# Patient Record
Sex: MALE | Race: WHITE | Employment: FULL TIME | URBAN - METROPOLITAN AREA
[De-identification: names, ages, dates, MRNs, and addresses within clinical notes are randomized per-mention and may not be internally consistent; named-entity substitution may affect disease eponyms.]

---

## 2017-02-14 ENCOUNTER — OFFICE VISIT (OUTPATIENT)
Dept: FAMILY MEDICINE CLINIC | Age: 42
End: 2017-02-14

## 2017-02-14 VITALS
RESPIRATION RATE: 13 BRPM | OXYGEN SATURATION: 98 % | BODY MASS INDEX: 36.51 KG/M2 | HEIGHT: 71 IN | SYSTOLIC BLOOD PRESSURE: 136 MMHG | DIASTOLIC BLOOD PRESSURE: 90 MMHG | HEART RATE: 84 BPM | TEMPERATURE: 98.3 F | WEIGHT: 260.8 LBS

## 2017-02-14 DIAGNOSIS — K12.2 UVULITIS: Primary | ICD-10-CM

## 2017-02-14 PROCEDURE — 99213 OFFICE O/P EST LOW 20 MIN: CPT | Performed by: FAMILY MEDICINE

## 2017-02-14 PROCEDURE — 96372 THER/PROPH/DIAG INJ SC/IM: CPT | Performed by: FAMILY MEDICINE

## 2017-02-14 RX ORDER — AMOXICILLIN AND CLAVULANATE POTASSIUM 875; 125 MG/1; MG/1
1 TABLET, FILM COATED ORAL 2 TIMES DAILY WITH MEALS
Qty: 20 TABLET | Refills: 0 | Status: SHIPPED | OUTPATIENT
Start: 2017-02-14 | End: 2017-02-24

## 2017-02-14 RX ORDER — METHYLPREDNISOLONE ACETATE 80 MG/ML
80 INJECTION, SUSPENSION INTRA-ARTICULAR; INTRALESIONAL; INTRAMUSCULAR; SOFT TISSUE ONCE
Status: COMPLETED | OUTPATIENT
Start: 2017-02-14 | End: 2017-02-14

## 2017-02-14 RX ORDER — METHYLPREDNISOLONE ACETATE 40 MG/ML
40 INJECTION, SUSPENSION INTRA-ARTICULAR; INTRALESIONAL; INTRAMUSCULAR; SOFT TISSUE ONCE
Status: COMPLETED | OUTPATIENT
Start: 2017-02-14 | End: 2017-02-14

## 2017-02-14 RX ADMIN — METHYLPREDNISOLONE ACETATE 80 MG: 80 INJECTION, SUSPENSION INTRA-ARTICULAR; INTRALESIONAL; INTRAMUSCULAR; SOFT TISSUE at 10:36

## 2017-02-14 RX ADMIN — METHYLPREDNISOLONE ACETATE 40 MG: 40 INJECTION, SUSPENSION INTRA-ARTICULAR; INTRALESIONAL; INTRAMUSCULAR; SOFT TISSUE at 10:34

## 2017-02-14 ASSESSMENT — ENCOUNTER SYMPTOMS
GASTROINTESTINAL NEGATIVE: 1
RESPIRATORY NEGATIVE: 1
VOICE CHANGE: 0
SORE THROAT: 0
TROUBLE SWALLOWING: 1

## 2017-11-08 DIAGNOSIS — M10.00 IDIOPATHIC GOUT, UNSPECIFIED CHRONICITY, UNSPECIFIED SITE: ICD-10-CM

## 2017-11-09 RX ORDER — ALLOPURINOL 300 MG/1
300 TABLET ORAL DAILY
Qty: 90 TABLET | Refills: 3 | Status: SHIPPED | OUTPATIENT
Start: 2017-11-09 | End: 2019-03-01 | Stop reason: SDUPTHER

## 2019-03-01 ENCOUNTER — OFFICE VISIT (OUTPATIENT)
Dept: FAMILY MEDICINE CLINIC | Age: 44
End: 2019-03-01
Payer: COMMERCIAL

## 2019-03-01 VITALS
HEIGHT: 71 IN | RESPIRATION RATE: 16 BRPM | SYSTOLIC BLOOD PRESSURE: 136 MMHG | DIASTOLIC BLOOD PRESSURE: 88 MMHG | WEIGHT: 260.6 LBS | HEART RATE: 67 BPM | OXYGEN SATURATION: 96 % | BODY MASS INDEX: 36.48 KG/M2

## 2019-03-01 DIAGNOSIS — Z00.129 ENCOUNTER FOR ROUTINE CHILD HEALTH EXAMINATION WITHOUT ABNORMAL FINDINGS: Primary | ICD-10-CM

## 2019-03-01 DIAGNOSIS — M10.00 IDIOPATHIC GOUT, UNSPECIFIED CHRONICITY, UNSPECIFIED SITE: ICD-10-CM

## 2019-03-01 DIAGNOSIS — E66.9 OBESITY (BMI 30-39.9): ICD-10-CM

## 2019-03-01 PROCEDURE — 99396 PREV VISIT EST AGE 40-64: CPT | Performed by: FAMILY MEDICINE

## 2019-03-01 RX ORDER — ALLOPURINOL 300 MG/1
300 TABLET ORAL DAILY
Qty: 90 TABLET | Refills: 3 | Status: SHIPPED | OUTPATIENT
Start: 2019-03-01 | End: 2020-07-29 | Stop reason: SDUPTHER

## 2019-03-01 RX ORDER — INDOMETHACIN 50 MG/1
50 CAPSULE ORAL EVERY 8 HOURS PRN
Qty: 30 CAPSULE | Refills: 2 | Status: SHIPPED | OUTPATIENT
Start: 2019-03-01 | End: 2021-01-26 | Stop reason: SDUPTHER

## 2019-03-01 RX ORDER — PHENTERMINE HYDROCHLORIDE 37.5 MG/1
37.5 CAPSULE ORAL EVERY MORNING
Qty: 30 CAPSULE | Refills: 0 | Status: SHIPPED | OUTPATIENT
Start: 2019-03-01 | End: 2019-03-29 | Stop reason: SDUPTHER

## 2019-03-01 ASSESSMENT — ENCOUNTER SYMPTOMS
GASTROINTESTINAL NEGATIVE: 1
RESPIRATORY NEGATIVE: 1

## 2019-03-01 ASSESSMENT — PATIENT HEALTH QUESTIONNAIRE - PHQ9
SUM OF ALL RESPONSES TO PHQ QUESTIONS 1-9: 0
1. LITTLE INTEREST OR PLEASURE IN DOING THINGS: 0
2. FEELING DOWN, DEPRESSED OR HOPELESS: 0
SUM OF ALL RESPONSES TO PHQ QUESTIONS 1-9: 0
SUM OF ALL RESPONSES TO PHQ9 QUESTIONS 1 & 2: 0

## 2019-03-29 ENCOUNTER — OFFICE VISIT (OUTPATIENT)
Dept: FAMILY MEDICINE CLINIC | Age: 44
End: 2019-03-29
Payer: COMMERCIAL

## 2019-03-29 VITALS
WEIGHT: 247.4 LBS | RESPIRATION RATE: 12 BRPM | SYSTOLIC BLOOD PRESSURE: 128 MMHG | DIASTOLIC BLOOD PRESSURE: 76 MMHG | HEART RATE: 84 BPM | HEIGHT: 70 IN | BODY MASS INDEX: 35.42 KG/M2

## 2019-03-29 DIAGNOSIS — E66.9 OBESITY (BMI 30-39.9): ICD-10-CM

## 2019-03-29 DIAGNOSIS — M10.00 IDIOPATHIC GOUT, UNSPECIFIED CHRONICITY, UNSPECIFIED SITE: Primary | ICD-10-CM

## 2019-03-29 PROCEDURE — 99213 OFFICE O/P EST LOW 20 MIN: CPT | Performed by: FAMILY MEDICINE

## 2019-03-29 RX ORDER — PHENTERMINE HYDROCHLORIDE 37.5 MG/1
37.5 CAPSULE ORAL EVERY MORNING
Qty: 30 CAPSULE | Refills: 0 | Status: SHIPPED | OUTPATIENT
Start: 2019-03-29 | End: 2019-04-26

## 2019-03-29 ASSESSMENT — ENCOUNTER SYMPTOMS
GASTROINTESTINAL NEGATIVE: 1
RESPIRATORY NEGATIVE: 1

## 2019-04-26 ENCOUNTER — OFFICE VISIT (OUTPATIENT)
Dept: FAMILY MEDICINE CLINIC | Age: 44
End: 2019-04-26
Payer: COMMERCIAL

## 2019-04-26 VITALS
BODY MASS INDEX: 34.61 KG/M2 | SYSTOLIC BLOOD PRESSURE: 130 MMHG | HEART RATE: 76 BPM | HEIGHT: 71 IN | WEIGHT: 247.2 LBS | DIASTOLIC BLOOD PRESSURE: 78 MMHG | RESPIRATION RATE: 20 BRPM

## 2019-04-26 DIAGNOSIS — E66.9 OBESITY (BMI 30-39.9): ICD-10-CM

## 2019-04-26 DIAGNOSIS — E78.00 PURE HYPERCHOLESTEROLEMIA: Primary | ICD-10-CM

## 2019-04-26 PROCEDURE — 99213 OFFICE O/P EST LOW 20 MIN: CPT | Performed by: FAMILY MEDICINE

## 2019-04-26 RX ORDER — PHENTERMINE HYDROCHLORIDE 37.5 MG/1
37.5 CAPSULE ORAL EVERY MORNING
Qty: 30 CAPSULE | Refills: 0 | Status: SHIPPED | OUTPATIENT
Start: 2019-04-26 | End: 2019-05-26

## 2019-04-26 ASSESSMENT — ENCOUNTER SYMPTOMS
GASTROINTESTINAL NEGATIVE: 1
RESPIRATORY NEGATIVE: 1

## 2019-04-26 NOTE — PROGRESS NOTES
Subjective:      Patient ID: Maciej Montoya is a 44 y.o. male. HPI:    Chief Complaint   Patient presents with    1 Month Follow-Up     obesity    Medication Refill     1 month eval.      Weight did not budge. 243 at home. Wt Readings from Last 3 Encounters:   04/26/19 247 lb 3.2 oz (112.1 kg)   03/29/19 247 lb 6.4 oz (112.2 kg)   03/01/19 260 lb 9.6 oz (118.2 kg)     Tolerating the medication fine. He is doing Weight Watchers. Patient Active Problem List   Diagnosis    Hyperhidrosis    Gout    Hyperlipidemia     Past Surgical History:   Procedure Laterality Date    TONSILLECTOMY      as a child     Prior to Admission medications    Medication Sig Start Date End Date Taking? Authorizing Provider   phentermine 37.5 MG capsule Take 1 capsule by mouth every morning for 30 days. 3/29/19 4/28/19 Yes Myrna Head DO   aluminum chloride (DRYSOL) 20 % external solution Apply topically as directed. 3/1/19  Yes Myrna Head DO   allopurinol (ZYLOPRIM) 300 MG tablet Take 1 tablet by mouth daily 3/1/19  Yes Myrna Head DO   indomethacin (INDOCIN) 50 MG capsule Take 1 capsule by mouth every 8 hours as needed (gout) 3/1/19  Yes Myrna Head DO         Review of Systems   Constitutional: Negative. HENT: Negative. Respiratory: Negative. Cardiovascular: Negative. Gastrointestinal: Negative. Musculoskeletal: Negative. All other systems reviewed and are negative. Objective:   Physical Exam   Constitutional: He is oriented to person, place, and time. He appears well-developed and well-nourished. HENT:   Head: Normocephalic and atraumatic. Right Ear: Tympanic membrane normal.   Left Ear: Tympanic membrane normal.   Mouth/Throat: Oropharynx is clear and moist and mucous membranes are normal.   Cardiovascular: Normal rate, regular rhythm and normal heart sounds. No murmur heard. Pulmonary/Chest: Effort normal and breath sounds normal.   Abdominal: Soft.  Bowel sounds are normal.   Musculoskeletal: He exhibits no edema. Neurological: He is alert and oriented to person, place, and time. Skin: Skin is warm and dry. Psychiatric: He has a normal mood and affect. His behavior is normal.   Nursing note and vitals reviewed. Assessment:       Diagnosis Orders   1. Pure hypercholesterolemia     2.  Obesity (BMI 30-39.9)  phentermine 37.5 MG capsule           Plan:      -  Pt down 13 per our scales, more at home scale per pt  -  Last refill sent  -  RTO prn        Sandro Doshi DO

## 2019-04-26 NOTE — PROGRESS NOTES
Visit Information    Have you changed or started any medications since your last visit including any over-the-counter medicines, vitamins, or herbal medicines? no   Are you having any side effects from any of your medications? -  no  Have you stopped taking any of your medications? Is so, why? -  no    Have you seen any other physician or provider since your last visit? No  Have you had any other diagnostic tests since your last visit? No  Have you been seen in the emergency room and/or had an admission to a hospital since we last saw you? No  Have you had your routine dental cleaning in the past 6 months? yes - 3/2019    Have you activated your [employer] account? If not, what are your barriers?  Yes     Patient Care Team:  Shirlene Rich DO as PCP - General (Family Medicine)    Medical History Review  Past Medical, Family, and Social History reviewed and does contribute to the patient presenting condition    Health Maintenance   Topic Date Due    HIV screen  09/11/1991    Diabetes screen  09/11/2016    Flu vaccine (Season Ended) 09/01/2019    Lipid screen  08/26/2021    DTaP/Tdap/Td vaccine (2 - Td) 02/01/2023    Pneumococcal 0-64 years Vaccine  Aged Out

## 2019-07-08 ENCOUNTER — OFFICE VISIT (OUTPATIENT)
Dept: FAMILY MEDICINE CLINIC | Age: 44
End: 2019-07-08
Payer: COMMERCIAL

## 2019-07-08 VITALS
RESPIRATION RATE: 20 BRPM | TEMPERATURE: 97.9 F | SYSTOLIC BLOOD PRESSURE: 144 MMHG | BODY MASS INDEX: 35.11 KG/M2 | HEIGHT: 71 IN | HEART RATE: 92 BPM | WEIGHT: 250.8 LBS | DIASTOLIC BLOOD PRESSURE: 74 MMHG

## 2019-07-08 DIAGNOSIS — H60.502 ACUTE OTITIS EXTERNA OF LEFT EAR, UNSPECIFIED TYPE: Primary | ICD-10-CM

## 2019-07-08 PROCEDURE — 99213 OFFICE O/P EST LOW 20 MIN: CPT | Performed by: FAMILY MEDICINE

## 2019-07-08 RX ORDER — CIPROFLOXACIN AND DEXAMETHASONE 3; 1 MG/ML; MG/ML
4 SUSPENSION/ DROPS AURICULAR (OTIC) 2 TIMES DAILY
Qty: 1 BOTTLE | Refills: 0 | Status: SHIPPED | OUTPATIENT
Start: 2019-07-08 | End: 2019-07-09

## 2019-07-08 NOTE — PROGRESS NOTES
Visit Information    Have you changed or started any medications since your last visit including any over-the-counter medicines, vitamins, or herbal medicines? no   Are you having any side effects from any of your medications? -  no  Have you stopped taking any of your medications? Is so, why? -  no    Have you seen any other physician or provider since your last visit? No  Have you had any other diagnostic tests since your last visit? No  Have you been seen in the emergency room and/or had an admission to a hospital since we last saw you? No  Have you had your routine dental cleaning in the past 6 months? yes - 2/2019    Have you activated your [employer] account? If not, what are your barriers?  Yes     Patient Care Team:  Amanda Jones DO as PCP - General (Family Medicine)  Amanda Jones DO as PCP - [facility] Provider    Medical History Review  Past Medical, Family, and Social History reviewed and does not contribute to the patient presenting condition    Health Maintenance   Topic Date Due    HIV screen  09/11/1991    Diabetes screen  09/11/2016    Flu vaccine (1) 09/01/2019    Lipid screen  08/26/2021    DTaP/Tdap/Td vaccine (2 - Td) 02/01/2023    Pneumococcal 0-64 years Vaccine  Aged Out
edema. Neurological: He is alert and oriented to person, place, and time. Skin: Skin is warm and dry. Psychiatric: He has a normal mood and affect. His behavior is normal.   Nursing note and vitals reviewed. Assessment:       Diagnosis Orders   1.  Acute otitis externa of left ear, unspecified type  ciprofloxacin-dexamethasone (CIPRODEX) 0.3-0.1 % otic suspension           Plan:      -  Orders above  -  Motrin, heating pad  -  RTO prn        Zabrina Garcia DO

## 2019-07-09 ENCOUNTER — TELEPHONE (OUTPATIENT)
Dept: FAMILY MEDICINE CLINIC | Age: 44
End: 2019-07-09

## 2019-07-09 ASSESSMENT — ENCOUNTER SYMPTOMS
GASTROINTESTINAL NEGATIVE: 1
RESPIRATORY NEGATIVE: 1

## 2019-09-04 ENCOUNTER — TELEPHONE (OUTPATIENT)
Dept: FAMILY MEDICINE CLINIC | Age: 44
End: 2019-09-04

## 2020-06-17 ENCOUNTER — PATIENT MESSAGE (OUTPATIENT)
Dept: FAMILY MEDICINE CLINIC | Age: 45
End: 2020-06-17

## 2020-07-29 RX ORDER — ALLOPURINOL 300 MG/1
300 TABLET ORAL DAILY
Qty: 90 TABLET | Refills: 3 | Status: SHIPPED | OUTPATIENT
Start: 2020-07-29 | End: 2021-02-01 | Stop reason: SDUPTHER

## 2020-10-07 ENCOUNTER — HOSPITAL ENCOUNTER (EMERGENCY)
Age: 45
Discharge: HOME | End: 2020-10-07
Payer: COMMERCIAL

## 2020-10-07 VITALS
OXYGEN SATURATION: 97 % | SYSTOLIC BLOOD PRESSURE: 159 MMHG | WEIGHT: 260 LBS | TEMPERATURE: 97.7 F | RESPIRATION RATE: 18 BRPM | HEART RATE: 88 BPM | DIASTOLIC BLOOD PRESSURE: 100 MMHG | BODY MASS INDEX: 36.4 KG/M2 | HEIGHT: 71 IN

## 2020-10-07 PROCEDURE — 6370000000 HC RX 637 (ALT 250 FOR IP)

## 2020-10-07 PROCEDURE — 99213 OFFICE O/P EST LOW 20 MIN: CPT | Performed by: NURSE PRACTITIONER

## 2020-10-07 PROCEDURE — 99212 OFFICE O/P EST SF 10 MIN: CPT

## 2020-10-07 RX ORDER — BACITRACIN, NEOMYCIN, POLYMYXIN B 400; 3.5; 5 [USP'U]/G; MG/G; [USP'U]/G
OINTMENT TOPICAL
Status: COMPLETED
Start: 2020-10-07 | End: 2020-10-07

## 2020-10-07 RX ORDER — BACITRACIN, NEOMYCIN, POLYMYXIN B 400; 3.5; 5 [USP'U]/G; MG/G; [USP'U]/G
OINTMENT TOPICAL ONCE
Status: DISCONTINUED | OUTPATIENT
Start: 2020-10-07 | End: 2020-10-07 | Stop reason: HOSPADM

## 2020-10-07 RX ADMIN — BACITRACIN, NEOMYCIN, POLYMYXIN B 1 G: 400; 3.5; 5 OINTMENT TOPICAL at 10:49

## 2020-10-07 ASSESSMENT — ENCOUNTER SYMPTOMS
VOMITING: 0
NAUSEA: 0

## 2020-10-07 NOTE — ED NOTES
Wound wash to left index finger per order. Dressing applied. Patient denies questions or concerns at this time.       Isra Joshi RN  10/07/20 [id number]

## 2020-10-07 NOTE — ED PROVIDER NOTES
[address]  Urgent Care Encounter       CHIEF COMPLAINT       Chief Complaint   Patient presents with    Laceration       Nurses Notes reviewed and I agree except as noted in the HPI. HISTORY OF PRESENT ILLNESS   Nataliya Arias is a 41 y.o. male who presents with a injury to the distal left index finger that occurred yesterday around noon. The patient cut the skin off the tip of his finger while using a pair of scissors. He washed the area and applied a dressing with gauze. Every time he removes the dressing the area begins bleeding again, prompting his evaluation at the urgent care center. The patient is unsure of his last tetanus vaccine but refuses an update today. The history is provided by the patient. REVIEW OF SYSTEMS     Review of Systems   Constitutional: Negative for fever. Gastrointestinal: Negative for nausea and vomiting. Skin: Positive for wound (Tip of left index finger). Neurological: Negative for numbness. PAST MEDICAL HISTORY         Diagnosis Date    Gout        SURGICALHISTORY     Patient  has a past surgical history that includes Tonsillectomy. CURRENT MEDICATIONS       Current Discharge Medication List      CONTINUE these medications which have NOT CHANGED    Details   allopurinol (ZYLOPRIM) 300 MG tablet Take 1 tablet by mouth daily  Qty: 90 tablet, Refills: 3    Associated Diagnoses: Idiopathic gout, unspecified chronicity, unspecified site      aluminum chloride (DRYSOL) 20 % external solution Apply topically as directed. Qty: 1 Bottle, Refills: 5      indomethacin (INDOCIN) 50 MG capsule Take 1 capsule by mouth every 8 hours as needed (gout)  Qty: 30 capsule, Refills: 2    Associated Diagnoses: Idiopathic gout, unspecified chronicity, unspecified site             ALLERGIES     Patient is has No Known Allergies.     Patients   Immunization History   Administered Date(s) Administered    Tdap (Boostrix, Adacel) 02/01/2013       FAMILY HISTORY Patient's family history includes High Blood Pressure in his father. SOCIAL HISTORY     Patient  reports that he has never smoked. He has never used smokeless tobacco. He reports that he does not drink alcohol or use drugs. PHYSICAL EXAM     ED TRIAGE VITALS  BP: (!) 159/100, Temp: 97.7 °F (36.5 °C), Pulse: 88, Resp: 18, SpO2: 97 %,Estimated body mass index is 36.26 kg/m² as calculated from the following:    Height as of this encounter: 5' 11\" (1.803 m). Weight as of this encounter: 260 lb (117.9 kg). ,No LMP for male patient. Physical Exam  Vitals signs and nursing note reviewed. Constitutional:       General: He is not in acute distress. Appearance: He is well-developed. HENT:      Head: Normocephalic and atraumatic. Pulmonary:      Effort: Pulmonary effort is normal. No respiratory distress. Musculoskeletal:      Left hand: He exhibits laceration (Avulsion injury to the lateral tip of the index finger without involvement of the nail. Some scabbing and fresh bleeding noted. No redness or swelling. ). Skin:     General: Skin is warm and dry. Neurological:      General: No focal deficit present. Mental Status: He is alert and oriented to person, place, and time. Psychiatric:         Mood and Affect: Mood normal.         Speech: Speech normal.         Behavior: Behavior normal. Behavior is cooperative. DIAGNOSTIC RESULTS     Labs:No results found for this visit on 10/07/20.     IMAGING:    No orders to display         EKG:      URGENT CARE COURSE:     Vitals:    10/07/20 1037   BP: (!) 159/100   Pulse: 88   Resp: 18   Temp: 97.7 °F (36.5 °C)   TempSrc: Temporal   SpO2: 97%   Weight: 260 lb (117.9 kg)   Height: 5' 11\" (1.803 m)       Medications   neomycin-bacitracin-polymyxin (NEOSPORIN) ointment ( Topical Canceled Entry 10/7/20 1049)   neomycin-bacitracin-polymyxin (NEOSPORIN) 400-5-5000 ointment (1 g  Given 10/7/20 1049)            PROCEDURES:  None    FINAL IMPRESSION 1. Avulsion of skin of index finger, initial encounter          DISPOSITION/ PLAN     Patient presents with an avulsion of the tip of the left index finger. Small lateral bleeding continues. No sign of infection. Helistat dressing and nonstick dressing applied. Small pressure dressing also applied to the tip of the finger. Patient is to leave the dressing intact for the next 24 hours and then wash twice a day with soap and water and apply antibiotic ointment twice daily as well. Cover with nonstick dressing. Monitor for any signs and symptoms of infection which were discussed. Follow-up with your doctor return urgent care with any concerns. Further instructions were outlined verbally and in the patient's discharge instructions. All the patient's questions were answered. The patient/parent agreed with the plan and was discharged from the [facility] in good condition.       PATIENT REFERRED TO:  DO Abiola Ortez, [address] / [facility] [address]      DISCHARGE MEDICATIONS:  Current Discharge Medication List          Current Discharge Medication List          Current Discharge Medication List          LORRAINE Angel - YAZAN    (Please note that portions of this note were completed with a voice recognition program. Efforts were made to edit the dictations but occasionally words are mis-transcribed.)         LORRAINE Angel - YAZAN  10/07/20 2673

## 2020-10-08 ENCOUNTER — TELEPHONE (OUTPATIENT)
Dept: FAMILY MEDICINE CLINIC | Age: 45
End: 2020-10-08

## 2021-01-26 DIAGNOSIS — M10.00 IDIOPATHIC GOUT, UNSPECIFIED CHRONICITY, UNSPECIFIED SITE: ICD-10-CM

## 2021-01-26 RX ORDER — INDOMETHACIN 50 MG/1
50 CAPSULE ORAL EVERY 8 HOURS PRN
Qty: 30 CAPSULE | Refills: 2 | Status: SHIPPED | OUTPATIENT
Start: 2021-01-26 | End: 2022-05-06

## 2021-01-26 NOTE — TELEPHONE ENCOUNTER
Requested Prescriptions     Pending Prescriptions Disp Refills    indomethacin (INDOCIN) 50 MG capsule 30 capsule 2     Sig: Take 1 capsule by mouth every 8 hours as needed (gout)

## 2021-02-01 ENCOUNTER — OFFICE VISIT (OUTPATIENT)
Dept: FAMILY MEDICINE CLINIC | Age: 46
End: 2021-02-01
Payer: COMMERCIAL

## 2021-02-01 VITALS
BODY MASS INDEX: 36.75 KG/M2 | RESPIRATION RATE: 16 BRPM | WEIGHT: 263.5 LBS | DIASTOLIC BLOOD PRESSURE: 104 MMHG | HEART RATE: 80 BPM | SYSTOLIC BLOOD PRESSURE: 144 MMHG | TEMPERATURE: 97.4 F

## 2021-02-01 DIAGNOSIS — E78.00 PURE HYPERCHOLESTEROLEMIA: ICD-10-CM

## 2021-02-01 DIAGNOSIS — I10 ESSENTIAL HYPERTENSION: ICD-10-CM

## 2021-02-01 DIAGNOSIS — Z00.00 WELL ADULT HEALTH CHECK: Primary | ICD-10-CM

## 2021-02-01 DIAGNOSIS — M10.00 IDIOPATHIC GOUT, UNSPECIFIED CHRONICITY, UNSPECIFIED SITE: ICD-10-CM

## 2021-02-01 PROCEDURE — 99396 PREV VISIT EST AGE 40-64: CPT | Performed by: FAMILY MEDICINE

## 2021-02-01 RX ORDER — AMLODIPINE BESYLATE AND BENAZEPRIL HYDROCHLORIDE 5; 10 MG/1; MG/1
1 CAPSULE ORAL DAILY
Qty: 30 CAPSULE | Refills: 1 | Status: SHIPPED | OUTPATIENT
Start: 2021-02-01 | End: 2021-02-15

## 2021-02-01 RX ORDER — ALLOPURINOL 300 MG/1
300 TABLET ORAL DAILY
Qty: 90 TABLET | Refills: 3 | Status: SHIPPED | OUTPATIENT
Start: 2021-02-01 | End: 2021-06-09 | Stop reason: SDUPTHER

## 2021-02-01 SDOH — ECONOMIC STABILITY: FOOD INSECURITY: WITHIN THE PAST 12 MONTHS, YOU WORRIED THAT YOUR FOOD WOULD RUN OUT BEFORE YOU GOT MONEY TO BUY MORE.: NEVER TRUE

## 2021-02-01 SDOH — ECONOMIC STABILITY: INCOME INSECURITY: HOW HARD IS IT FOR YOU TO PAY FOR THE VERY BASICS LIKE FOOD, HOUSING, MEDICAL CARE, AND HEATING?: NOT HARD AT ALL

## 2021-02-01 SDOH — ECONOMIC STABILITY: TRANSPORTATION INSECURITY
IN THE PAST 12 MONTHS, HAS LACK OF TRANSPORTATION KEPT YOU FROM MEETINGS, WORK, OR FROM GETTING THINGS NEEDED FOR DAILY LIVING?: NO

## 2021-02-01 SDOH — ECONOMIC STABILITY: FOOD INSECURITY: WITHIN THE PAST 12 MONTHS, THE FOOD YOU BOUGHT JUST DIDN'T LAST AND YOU DIDN'T HAVE MONEY TO GET MORE.: NEVER TRUE

## 2021-02-01 SDOH — ECONOMIC STABILITY: TRANSPORTATION INSECURITY
IN THE PAST 12 MONTHS, HAS THE LACK OF TRANSPORTATION KEPT YOU FROM MEDICAL APPOINTMENTS OR FROM GETTING MEDICATIONS?: NO

## 2021-02-01 ASSESSMENT — ENCOUNTER SYMPTOMS
GASTROINTESTINAL NEGATIVE: 1
RESPIRATORY NEGATIVE: 1

## 2021-02-01 ASSESSMENT — PATIENT HEALTH QUESTIONNAIRE - PHQ9
SUM OF ALL RESPONSES TO PHQ9 QUESTIONS 1 & 2: 0
1. LITTLE INTEREST OR PLEASURE IN DOING THINGS: 0
SUM OF ALL RESPONSES TO PHQ QUESTIONS 1-9: 0

## 2021-02-01 NOTE — PROGRESS NOTES
Visit Information    Have you changed or started any medications since your last visit including any over-the-counter medicines, vitamins, or herbal medicines? no   Are you having any side effects from any of your medications? -  no  Have you stopped taking any of your medications? Is so, why? -  no    Have you seen any other physician or provider since your last visit? No  Have you had any other diagnostic tests since your last visit? No  Have you been seen in the emergency room and/or had an admission to a hospital since we last saw you? No  Have you had your routine dental cleaning in the past 6 months? yes - 10/2020    Have you activated your [employer] account? If not, what are your barriers?  Yes     Patient Care Team:  Tita Villa,  as PCP - General (Family Medicine)  Tita Villa,  as PCP - [facility] Provider    Medical History Review  Past Medical, Family, and Social History reviewed and does contribute to the patient presenting condition    Health Maintenance   Topic Date Due    Hepatitis C screen  1976    HIV screen  09/11/1991    Diabetes screen  09/11/2016    Flu vaccine (1) 09/01/2020    Lipid screen  08/26/2021    DTaP/Tdap/Td vaccine (2 - Td) 02/01/2023    Hepatitis A vaccine  Aged Out    Hepatitis B vaccine  Aged Out    Hib vaccine  Aged Out    Meningococcal (ACWY) vaccine  Aged Out    Pneumococcal 0-64 years Vaccine  Aged Out

## 2021-02-01 NOTE — PROGRESS NOTES
 Highest education level: Bachelor's degree (e.g., BA, AB, BS)   Occupational History    Not on file   Social Needs    Financial resource strain: Not hard at all   [place] insecurity     Worry: Never true     Inability: Never true   [employer] needs     Medical: No     Non-medical: No   Tobacco Use    Smoking status: Never Smoker    Smokeless tobacco: Never Used   Substance and Sexual Activity    Alcohol use: No    Drug use: No    Sexual activity: Not on file   Lifestyle    Physical activity     Days per week: Not on file     Minutes per session: Not on file    Stress: Not on file   Relationships    Social connections     Talks on phone: Not on file     Gets together: Not on file     Attends [employer] service: Not on file     Active member of club or organization: Not on file     Attends meetings of clubs or organizations: Not on file     Relationship status: Not on file    Intimate partner violence     Fear of current or ex partner: Not on file     Emotionally abused: Not on file     Physically abused: Not on file     Forced sexual activity: Not on file   Other Topics Concern    Not on file   Social History Narrative    Not on file        Family History   Problem Relation Age of Onset    High Blood Pressure Father        ADVANCE DIRECTIVE: N, <no information>    Vitals:    02/01/21 0848 02/01/21 0852   BP: (!) 158/104 (!) 144/104   Site: Left Upper Arm Left Upper Arm   Position: Sitting Sitting   Cuff Size: Large Adult Large Adult   Pulse: 80    Resp: 16    Temp: 97.4 °F (36.3 °C)    TempSrc: Temporal    Weight: 263 lb 8 oz (119.5 kg)      Estimated body mass index is 36.75 kg/m² as calculated from the following:    Height as of 10/7/20: 5' 11\" (1.803 m). Weight as of this encounter: 263 lb 8 oz (119.5 kg). Physical Exam  Vitals signs and nursing note reviewed. Constitutional:       General: He is not in acute distress. Appearance: Normal appearance. He is well-developed.    HENT: Head: Normocephalic and atraumatic. Right Ear: Tympanic membrane normal.      Left Ear: Tympanic membrane normal.   Eyes:      Conjunctiva/sclera: Conjunctivae normal.   Neck:      Musculoskeletal: Neck supple. Cardiovascular:      Rate and Rhythm: Normal rate and regular rhythm. Heart sounds: Normal heart sounds. No murmur. Pulmonary:      Effort: Pulmonary effort is normal.      Breath sounds: Normal breath sounds. No wheezing, rhonchi or rales. Abdominal:      General: There is no distension. Skin:     General: Skin is warm and dry. Findings: No rash (on exposed surfaces). Neurological:      General: No focal deficit present. Mental Status: He is alert. Psychiatric:         Attention and Perception: Attention normal.         Mood and Affect: Mood normal.         Speech: Speech normal.         Behavior: Behavior normal. Behavior is cooperative. Thought Content: Thought content normal.         Judgment: Judgment normal.         No flowsheet data found.     Lab Results   Component Value Date    CHOL 195 08/26/2016    CHOL 187 02/10/2015    TRIG 379 08/26/2016    TRIG 403 02/10/2015    HDL 33 08/26/2016    HDL 29 02/10/2015    LDLCALC 86 08/26/2016    LDLCALC SEE BELOW 02/10/2015    GLUCOSE 98 02/10/2015       The ASCVD Risk score (Chelsi Bruce, et al., 2013) failed to calculate for the following reasons:    Cannot find a previous HDL lab    Cannot find a previous total cholesterol lab    Immunization History   Administered Date(s) Administered    Influenza A ([id number]) Vaccine PF IM 02/04/2010    Tdap (Boostrix, Adacel) 02/01/2013       Health Maintenance   Topic Date Due    Potassium monitoring  1976    Creatinine monitoring  1976    Hepatitis C screen  1976    HIV screen  09/11/1991    Diabetes screen  09/11/2016    Flu vaccine (1) 09/01/2020    Lipid screen  08/26/2021    DTaP/Tdap/Td vaccine (2 - Td) 02/01/2023  Hepatitis A vaccine  Aged Out    Hepatitis B vaccine  Aged Out    Hib vaccine  Aged Out    Meningococcal (ACWY) vaccine  Aged Out    Pneumococcal 0-64 years Vaccine  Aged Out       ASSESSMENT/PLAN:  1. Well adult health check  -     Lipid Panel w/ Reflex Direct LDL; Future  -     Comprehensive Metabolic Panel; Future  -     Hemoglobin A1C; Future  -     TSH with Reflex; Future  -     CBC Auto Differential; Future  2. Idiopathic gout, unspecified chronicity, unspecified site  -     allopurinol (ZYLOPRIM) 300 MG tablet; Take 1 tablet by mouth daily, Disp-90 tablet, R-3Normal  -     Uric Acid; Future  3. Pure hypercholesterolemia  4. Essential hypertension  -     amLODIPine-benazepril (LOTREL) 5-10 MG per capsule; Take 1 capsule by mouth daily, Disp-30 capsule, R-1Normal    -  Healthy lifestyle discussed  -  Chronic medical problems stable  -  Continue current medications  -  Start Lotrel  -  Check labs above    Return in about 2 weeks (around 2/15/2021) for HTN. An electronic signature was used to authenticate this note.     --Armand Chase DO on 2/1/2021 at 10:08 AM

## 2021-02-09 ENCOUNTER — HOSPITAL ENCOUNTER (OUTPATIENT)
Age: 46
Discharge: HOME | End: 2021-02-09
Payer: COMMERCIAL

## 2021-02-09 DIAGNOSIS — M10.00 IDIOPATHIC GOUT, UNSPECIFIED CHRONICITY, UNSPECIFIED SITE: ICD-10-CM

## 2021-02-09 DIAGNOSIS — Z00.00 WELL ADULT HEALTH CHECK: ICD-10-CM

## 2021-02-09 LAB
ALBUMIN SERPL-MCNC: 4.5 G/DL (ref 3.5–5.1)
ALP BLD-CCNC: 88 U/L (ref 38–126)
ALT SERPL-CCNC: 87 U/L (ref 11–66)
ANION GAP SERPL CALCULATED.3IONS-SCNC: 10 MEQ/L (ref 8–16)
AST SERPL-CCNC: 54 U/L (ref 5–40)
AVERAGE GLUCOSE: 96 MG/DL (ref 70–126)
BASOPHILS # BLD: 0.8 %
BASOPHILS ABSOLUTE: 0.1 THOU/MM3 (ref 0–0.1)
BILIRUB SERPL-MCNC: 0.6 MG/DL (ref 0.3–1.2)
BUN BLDV-MCNC: 14 MG/DL (ref 7–22)
CALCIUM SERPL-MCNC: 9.8 MG/DL (ref 8.5–10.5)
CHLORIDE BLD-SCNC: 104 MEQ/L (ref 98–111)
CHOLESTEROL, TOTAL: 181 MG/DL (ref 100–199)
CO2: 28 MEQ/L (ref 23–33)
CREAT SERPL-MCNC: 1 MG/DL (ref 0.4–1.2)
EOSINOPHIL # BLD: 2 %
EOSINOPHILS ABSOLUTE: 0.2 THOU/MM3 (ref 0–0.4)
ERYTHROCYTE [DISTWIDTH] IN BLOOD BY AUTOMATED COUNT: 13.3 % (ref 11.5–14.5)
ERYTHROCYTE [DISTWIDTH] IN BLOOD BY AUTOMATED COUNT: 41.1 FL (ref 35–45)
GFR SERPL CREATININE-BSD FRML MDRD: 81 ML/MIN/1.73M2
GLUCOSE BLD-MCNC: 101 MG/DL (ref 70–108)
HBA1C MFR BLD: 5.2 % (ref 4.4–6.4)
HCT VFR BLD CALC: 46.1 % (ref 42–52)
HDLC SERPL-MCNC: 31 MG/DL
HEMOGLOBIN: 15.5 GM/DL (ref 14–18)
IMMATURE GRANS (ABS): 0.03 THOU/MM3 (ref 0–0.07)
IMMATURE GRANULOCYTES: 0.4 %
LDL CHOLESTEROL CALCULATED: 86 MG/DL
LYMPHOCYTES # BLD: 20.9 %
LYMPHOCYTES ABSOLUTE: 1.6 THOU/MM3 (ref 1–4.8)
MCH RBC QN AUTO: 29 PG (ref 26–33)
MCHC RBC AUTO-ENTMCNC: 33.6 GM/DL (ref 32.2–35.5)
MCV RBC AUTO: 86.2 FL (ref 80–94)
MONOCYTES # BLD: 7.5 %
MONOCYTES ABSOLUTE: 0.6 THOU/MM3 (ref 0.4–1.3)
NUCLEATED RED BLOOD CELLS: 0 /100 WBC
PLATELET # BLD: 295 THOU/MM3 (ref 130–400)
PMV BLD AUTO: 10.3 FL (ref 9.4–12.4)
POTASSIUM SERPL-SCNC: 4.7 MEQ/L (ref 3.5–5.2)
RBC # BLD: 5.35 MILL/MM3 (ref 4.7–6.1)
SEG NEUTROPHILS: 68.4 %
SEGMENTED NEUTROPHILS ABSOLUTE COUNT: 5.2 THOU/MM3 (ref 1.8–7.7)
SODIUM BLD-SCNC: 142 MEQ/L (ref 135–145)
TOTAL PROTEIN: 7.9 G/DL (ref 6.1–8)
TRIGL SERPL-MCNC: 318 MG/DL (ref 0–199)
TSH SERPL DL<=0.05 MIU/L-ACNC: 1.89 UIU/ML (ref 0.4–4.2)
URIC ACID: 6.3 MG/DL (ref 3.7–7)
WBC # BLD: 7.6 THOU/MM3 (ref 4.8–10.8)

## 2021-02-09 PROCEDURE — 84443 ASSAY THYROID STIM HORMONE: CPT

## 2021-02-09 PROCEDURE — 80053 COMPREHEN METABOLIC PANEL: CPT

## 2021-02-09 PROCEDURE — 84550 ASSAY OF BLOOD/URIC ACID: CPT

## 2021-02-09 PROCEDURE — 36415 COLL VENOUS BLD VENIPUNCTURE: CPT

## 2021-02-09 PROCEDURE — 85025 COMPLETE CBC W/AUTO DIFF WBC: CPT

## 2021-02-09 PROCEDURE — 80061 LIPID PANEL: CPT

## 2021-02-09 PROCEDURE — 83036 HEMOGLOBIN GLYCOSYLATED A1C: CPT

## 2021-02-15 ENCOUNTER — OFFICE VISIT (OUTPATIENT)
Dept: FAMILY MEDICINE CLINIC | Age: 46
End: 2021-02-15
Payer: COMMERCIAL

## 2021-02-15 VITALS
SYSTOLIC BLOOD PRESSURE: 140 MMHG | TEMPERATURE: 97.3 F | HEART RATE: 80 BPM | WEIGHT: 264 LBS | BODY MASS INDEX: 36.82 KG/M2 | DIASTOLIC BLOOD PRESSURE: 96 MMHG | RESPIRATION RATE: 16 BRPM

## 2021-02-15 DIAGNOSIS — R79.89 ELEVATED LFTS: ICD-10-CM

## 2021-02-15 DIAGNOSIS — I10 ESSENTIAL HYPERTENSION: Primary | ICD-10-CM

## 2021-02-15 DIAGNOSIS — M10.00 IDIOPATHIC GOUT, UNSPECIFIED CHRONICITY, UNSPECIFIED SITE: ICD-10-CM

## 2021-02-15 DIAGNOSIS — E78.00 PURE HYPERCHOLESTEROLEMIA: ICD-10-CM

## 2021-02-15 DIAGNOSIS — E66.9 OBESITY (BMI 30-39.9): ICD-10-CM

## 2021-02-15 PROCEDURE — 99214 OFFICE O/P EST MOD 30 MIN: CPT | Performed by: FAMILY MEDICINE

## 2021-02-15 RX ORDER — AMLODIPINE BESYLATE AND BENAZEPRIL HYDROCHLORIDE 10; 20 MG/1; MG/1
1 CAPSULE ORAL DAILY
Qty: 90 CAPSULE | Refills: 3 | Status: SHIPPED | OUTPATIENT
Start: 2021-02-15 | End: 2021-04-26 | Stop reason: SDUPTHER

## 2021-02-15 ASSESSMENT — ENCOUNTER SYMPTOMS
RESPIRATORY NEGATIVE: 1
GASTROINTESTINAL NEGATIVE: 1

## 2021-02-15 NOTE — PROGRESS NOTES
2/15/2021    Bernardino Sanders (:  1975) is a 41 y.o. male, here for a preventive medicine evaluation. Chief Complaint   Patient presents with    Follow-up     2 weeks    Hypertension    Discuss Labs     2 week eval.  Doing well overall. BP still too high. No home readings. Tolerating Lotrel. BP Readings from Last 3 Encounters:   02/15/21 (!) 140/96   21 (!) 144/104   10/07/20 (!) 159/100     Weight up another lb. Wt Readings from Last 3 Encounters:   02/15/21 264 lb (119.7 kg)   21 263 lb 8 oz (119.5 kg)   10/07/20 260 lb (117.9 kg)       Patient Active Problem List   Diagnosis    Hyperhidrosis    Gout    Hyperlipidemia       Review of Systems   Constitutional: Negative. HENT: Negative. Respiratory: Negative. Cardiovascular: Negative. Gastrointestinal: Negative. Musculoskeletal: Negative. All other systems reviewed and are negative. Prior to Visit Medications    Medication Sig Taking? Authorizing Provider   amLODIPine-benazepril (LOTREL) 10-20 MG per capsule Take 1 capsule by mouth daily Yes Barabara Lovings, DO   allopurinol (ZYLOPRIM) 300 MG tablet Take 1 tablet by mouth daily Yes Barabara Lovings, DO   indomethacin (INDOCIN) 50 MG capsule Take 1 capsule by mouth every 8 hours as needed (gout) Yes Barabara Lovings, DO   aluminum chloride (DRYSOL) 20 % external solution Apply topically as directed. Yes Barabara Lovings, DO        No Known Allergies    Past Medical History:   Diagnosis Date    Gout        Past Surgical History:   Procedure Laterality Date    TONSILLECTOMY      as a child       Social History     Socioeconomic History    Marital status:      Spouse name: Low Dinh Number of children: 3    Years of education: 16    Highest education level:  Bachelor's degree (e.g., BA, AB, BS)   Occupational History    Not on file   Social Needs    Financial resource strain: Not hard at all   [employer] insecurity     Worry: Never true Inability: Never true    Transportation needs     Medical: No     Non-medical: No   Tobacco Use    Smoking status: Never Smoker    Smokeless tobacco: Never Used   Substance and Sexual Activity    Alcohol use: No    Drug use: No    Sexual activity: Not on file   Lifestyle    Physical activity     Days per week: Not on file     Minutes per session: Not on file    Stress: Not on file   Relationships    Social connections     Talks on phone: Not on file     Gets together: Not on file     Attends [employer] service: Not on file     Active member of club or organization: Not on file     Attends meetings of clubs or organizations: Not on file     Relationship status: Not on file    Intimate partner violence     Fear of current or ex partner: Not on file     Emotionally abused: Not on file     Physically abused: Not on file     Forced sexual activity: Not on file   Other Topics Concern    Not on file   Social History Narrative    Not on file        Family History   Problem Relation Age of Onset    High Blood Pressure Father        ADVANCE DIRECTIVE: N, <no information>    Vitals:    02/15/21 0831 02/15/21 0835   BP: (!) 134/96 (!) 140/96   Site: Left Upper Arm Left Upper Arm   Position: Sitting Sitting   Cuff Size: Large Adult Large Adult   Pulse: 80    Resp: 16    Temp: 97.3 °F (36.3 °C)    TempSrc: Temporal    Weight: 264 lb (119.7 kg)      Estimated body mass index is 36.82 kg/m² as calculated from the following:    Height as of 10/7/20: 5' 11\" (1.803 m). Weight as of this encounter: 264 lb (119.7 kg). Physical Exam  Vitals signs and nursing note reviewed. Constitutional:       General: He is not in acute distress. Appearance: Normal appearance. He is well-developed. HENT:      Head: Normocephalic and atraumatic. Right Ear: Tympanic membrane normal.      Left Ear: Tympanic membrane normal.   Eyes:      Conjunctiva/sclera: Conjunctivae normal.   Neck:      Musculoskeletal: Neck supple. Cardiovascular:      Rate and Rhythm: Normal rate and regular rhythm. Heart sounds: Normal heart sounds. No murmur. Pulmonary:      Effort: Pulmonary effort is normal.      Breath sounds: Normal breath sounds. No wheezing, rhonchi or rales. Abdominal:      General: There is no distension. Skin:     General: Skin is warm and dry. Findings: No rash (on exposed surfaces). Neurological:      General: No focal deficit present. Mental Status: He is alert. Psychiatric:         Attention and Perception: Attention normal.         Mood and Affect: Mood normal.         Speech: Speech normal.         Behavior: Behavior normal. Behavior is cooperative. Thought Content: Thought content normal.         Judgment: Judgment normal.         No flowsheet data found.     Lab Results   Component Value Date    CHOL 181 02/09/2021    CHOL 195 08/26/2016    CHOL 187 02/10/2015    TRIG 318 02/09/2021    TRIG 379 08/26/2016    TRIG 403 02/10/2015    HDL 31 02/09/2021    HDL 33 08/26/2016    HDL 29 02/10/2015    LDLCALC 86 02/09/2021    LDLCALC 86 08/26/2016    LDLCALC SEE BELOW 02/10/2015    GLUCOSE 101 02/09/2021    LABA1C 5.2 02/09/2021       The 10-year ASCVD risk score (Mahi Gonzalez, et al., 2013) is: 3.3%    Values used to calculate the score:      Age: 41 years      Sex: Male      Is Non- : No      Diabetic: No      Tobacco smoker: No      Systolic Blood Pressure: 979 mmHg      Is BP treated: No      HDL Cholesterol: 31 mg/dL      Total Cholesterol: 181 mg/dL    Immunization History   Administered Date(s) Administered    COVID-19, Moderna, 100mcg/0.5ml 02/02/2021    Influenza A (L6W3-33) Vaccine PF IM 02/04/2010    Tdap (Boostrix, Adacel) 02/01/2013       Health Maintenance   Topic Date Due    Hepatitis C screen  1975    HIV screen  09/11/1991    Flu vaccine (1) 09/01/2020    COVID-19 Vaccine (2 of 2 - Moderna series) 03/02/2021    Potassium monitoring  02/09/2022  Creatinine monitoring  02/09/2022    DTaP/Tdap/Td vaccine (2 - Td) 02/01/2023    Diabetes screen  02/09/2024    Lipid screen  02/09/2026    Hepatitis A vaccine  Aged Out    Hepatitis B vaccine  Aged Out    Hib vaccine  Aged Out    Meningococcal (ACWY) vaccine  Aged Out    Pneumococcal 0-64 years Vaccine  Aged Out       ASSESSMENT/PLAN:  1. Essential hypertension  -     amLODIPine-benazepril (LOTREL) 10-20 MG per capsule; Take 1 capsule by mouth daily, Disp-90 capsule, R-3Normal  2. Pure hypercholesterolemia  3. Idiopathic gout, unspecified chronicity, unspecified site  4. Obesity (BMI 30-39.9)  5. Elevated LFTs    -  Chronic medical problems stable  -  Continue current medications, will increase Lotrel to help with BP control  -  Labs reviewed, dietary changes discussed      Return in about 1 year (around 2/15/2022) for HTN. An electronic signature was used to authenticate this note.     --Mila Rosen, DO on 2/15/2021 at 8:51 AM

## 2021-02-15 NOTE — PROGRESS NOTES
Visit Information    Have you changed or started any medications since your last visit including any over-the-counter medicines, vitamins, or herbal medicines? no   Are you having any side effects from any of your medications? -  no  Have you stopped taking any of your medications? Is so, why? -  no    Have you seen any other physician or provider since your last visit? No  Have you had any other diagnostic tests since your last visit? Yes - Records Obtained  Have you been seen in the emergency room and/or had an admission to a hospital since we last saw you? No  Have you had your routine dental cleaning in the past 6 months? yes - 10/2020    Have you activated your [employer] account? If not, what are your barriers?  Yes     Patient Care Team:  Juwan Coker DO as PCP - General (Family Medicine)  Juwan Coker DO as PCP - [facility] Provider    Medical History Review  Past Medical, Family, and Social History reviewed and does contribute to the patient presenting condition    Health Maintenance   Topic Date Due    Hepatitis C screen  1975    HIV screen  09/11/1991    Flu vaccine (1) 09/01/2020    COVID-19 Vaccine (2 of 2 - Moderna series) 03/02/2021    Potassium monitoring  02/09/2022    Creatinine monitoring  02/09/2022    DTaP/Tdap/Td vaccine (2 - Td) 02/01/2023    Diabetes screen  02/09/2024    Lipid screen  02/09/2026    Hepatitis A vaccine  Aged Out    Hepatitis B vaccine  Aged Out    Hib vaccine  Aged Out    Meningococcal (ACWY) vaccine  Aged Out    Pneumococcal 0-64 years Vaccine  Aged Out

## 2021-02-19 ENCOUNTER — APPOINTMENT (OUTPATIENT)
Dept: GENERAL RADIOLOGY | Age: 46
End: 2021-02-19
Payer: COMMERCIAL

## 2021-02-19 ENCOUNTER — HOSPITAL ENCOUNTER (EMERGENCY)
Age: 46
Discharge: HOME | End: 2021-02-19
Payer: COMMERCIAL

## 2021-02-19 VITALS
RESPIRATION RATE: 16 BRPM | DIASTOLIC BLOOD PRESSURE: 78 MMHG | BODY MASS INDEX: 36.4 KG/M2 | HEART RATE: 91 BPM | WEIGHT: 260 LBS | OXYGEN SATURATION: 95 % | TEMPERATURE: 97.2 F | HEIGHT: 71 IN | SYSTOLIC BLOOD PRESSURE: 125 MMHG

## 2021-02-19 DIAGNOSIS — S52.572A OTHER CLOSED INTRA-ARTICULAR FRACTURE OF DISTAL END OF LEFT RADIUS, INITIAL ENCOUNTER: Primary | ICD-10-CM

## 2021-02-19 PROCEDURE — 99213 OFFICE O/P EST LOW 20 MIN: CPT

## 2021-02-19 PROCEDURE — 99213 OFFICE O/P EST LOW 20 MIN: CPT | Performed by: NURSE PRACTITIONER

## 2021-02-19 PROCEDURE — 29125 APPL SHORT ARM SPLINT STATIC: CPT

## 2021-02-19 PROCEDURE — 73110 X-RAY EXAM OF WRIST: CPT

## 2021-02-19 ASSESSMENT — ENCOUNTER SYMPTOMS
CHEST TIGHTNESS: 0
NAUSEA: 0
COUGH: 0
COLOR CHANGE: 1
VOMITING: 0

## 2021-02-19 ASSESSMENT — PAIN SCALES - GENERAL: PAINLEVEL_OUTOF10: 6

## 2021-02-19 ASSESSMENT — PAIN DESCRIPTION - DESCRIPTORS: DESCRIPTORS: DISCOMFORT

## 2021-02-19 ASSESSMENT — PAIN DESCRIPTION - ORIENTATION: ORIENTATION: RIGHT

## 2021-02-19 ASSESSMENT — PAIN DESCRIPTION - ONSET: ONSET: SUDDEN

## 2021-02-19 NOTE — ED TRIAGE NOTES
Pt to urgent care due to a right wrist injury that occurred this afternoon while playing basketball. He states another player ran into him causing him to fall backwards and he caught himself by landing on his right wrist. Pt was given an ice pack while at the urgent care.

## 2021-02-19 NOTE — ED PROVIDER NOTES
[address]  Urgent Care Encounter       CHIEF COMPLAINT       Chief Complaint   Patient presents with    Wrist Injury     right       Nurses Notes reviewed and I agree except as noted in the HPI. HISTORY OF PRESENT ILLNESS   Karen Neves is a 41 y.o. male who presents with right wrist pain. 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 He caught himself with his right hand. He stated that his pain is a 7. Thumb is bruised and swollen. He denies numbness and tingling, but complains of tightness in his thumb. He stated that he has has not taken anything for pain but has applied ice to his hand. The history is provided by the patient. REVIEW OF SYSTEMS     Review of Systems   Constitutional: Negative for appetite change, chills and fever. Respiratory: Negative for cough and chest tightness. Cardiovascular: Negative for chest pain. Gastrointestinal: Negative for nausea and vomiting. Skin: Positive for color change. Negative for wound. Injury right hand   Neurological: Negative for dizziness and numbness. Psychiatric/Behavioral: The patient is not nervous/anxious. PAST MEDICAL HISTORY         Diagnosis Date    Gout        SURGICALHISTORY     Patient  has a past surgical history that includes Tonsillectomy. CURRENT MEDICATIONS       Discharge Medication List as of 2/19/2021  5:03 PM      CONTINUE these medications which have NOT CHANGED    Details   allopurinol (ZYLOPRIM) 300 MG tablet Take 1 tablet by mouth daily, Disp-90 tablet, R-3Normal      amLODIPine-benazepril (LOTREL) 10-20 MG per capsule Take 1 capsule by mouth daily, Disp-90 capsule, R-3Normal      indomethacin (INDOCIN) 50 MG capsule Take 1 capsule by mouth every 8 hours as needed (gout), Disp-30 capsule, R-2Normal             ALLERGIES     Patient is has No Known Allergies.     Patients   Immunization History Administered Date(s) Administered    COVID-19, Moderna, 100mcg/0.5ml 02/02/2021    Influenza A (M8H3-04) Vaccine PF IM 02/04/2010    Tdap (Boostrix, Adacel) 02/01/2013       FAMILY HISTORY     Patient's family history includes High Blood Pressure in his father. SOCIAL HISTORY     Patient  reports that he has never smoked. He has never used smokeless tobacco. He reports that he does not drink alcohol or use drugs. PHYSICAL EXAM     ED TRIAGE VITALS  BP: 125/78, Temp: 97.2 °F (36.2 °C), Pulse: 91, Resp: 16, SpO2: 95 %,Estimated body mass index is 36.26 kg/m² as calculated from the following:    Height as of this encounter: 5' 11\" (1.803 m). Weight as of this encounter: 260 lb (117.9 kg). ,No LMP for male patient. Physical Exam  Vitals signs and nursing note reviewed. Constitutional:       General: He is not in acute distress. Appearance: He is well-developed. HENT:      Head: Normocephalic and atraumatic. Right Ear: Tympanic membrane normal.      Left Ear: Tympanic membrane normal.      Nose: Nose normal.      Mouth/Throat:      Mouth: Mucous membranes are dry. Neck:      Musculoskeletal: Normal range of motion and neck supple. Cardiovascular:      Rate and Rhythm: Normal rate and regular rhythm. Pulses: Normal pulses. Heart sounds: Normal heart sounds. Pulmonary:      Effort: Pulmonary effort is normal. No respiratory distress. Abdominal:      General: Abdomen is flat. Palpations: Abdomen is soft. Musculoskeletal:         General: Swelling, tenderness and signs of injury present. Right wrist: He exhibits decreased range of motion, tenderness, bony tenderness and swelling. Arms:    Skin:     General: Skin is warm and dry. Neurological:      General: No focal deficit present. Mental Status: He is alert and oriented to person, place, and time.    Psychiatric:         Mood and Affect: Mood normal.         Speech: Speech normal.         Behavior: Behavior normal. Behavior is cooperative. DIAGNOSTIC RESULTS     Labs:No results found for this visit on 02/19/21. IMAGING:    XR WRIST RIGHT (MIN 3 VIEWS)   Final Result       1. Nondisplaced fracture in the distal radius which extends to the radiocarpal joint surface. **This report has been created using voice recognition software. It may contain minor errors which are inherent in voice recognition technology. **      Final report electronically signed by DR Asia Harrison on 2/19/2021 4:49 PM            EKG:      URGENT CARE COURSE:     Vitals:    02/19/21 1635   BP: 125/78   Pulse: 91   Resp: 16   Temp: 97.2 °F (36.2 °C)   TempSrc: Temporal   SpO2: 95%   Weight: 260 lb (117.9 kg)   Height: 5' 11\" (1.803 m)       Medications - No data to display         PROCEDURES:  None    FINAL IMPRESSION      1. Other closed intra-articular fracture of distal end of left radius, initial encounter          DISPOSITION/ PLAN     Patient with a non displaced fracture in the distal radius which extends to the radiocarpal joint surface. Sugar tong splint and sling applied. Discussed with patient to take tylenol or ibuprofen for pain. [facility] that he can go to Saturday or they have a walk in clinic Monday-Friday  7:30-4  Further instructions were outlined verbally and in the patient's discharge instructions. All the patient's questions were answered. The patient/parent agreed with the plan and was discharged from the [facility] in good condition.         PATIENT REFERRED TO:  DO Abiola Gilbert, [address] / [address] [address]      DISCHARGE MEDICATIONS:  Discharge Medication List as of 2/19/2021  5:03 PM          Discharge Medication List as of 2/19/2021  5:03 PM          Discharge Medication List as of 2/19/2021  5:03 PM          Shameka Painting, APRN - CNP    (Please note that portions of this note were completed with a voice recognition program. Efforts were made to edit the dictations but occasionally words are mis-transcribed.)         Sharmaine English Case, APRN - CNP  02/19/21 [id number]

## 2021-02-19 NOTE — ED NOTES
Sugar tong splint applied to pt's right arm/wrist without difficulty. Good PMS is noted. Pt verbalized understanding. Jacqueline Painting NP in room to check the splint. Pt is going to follow up tomorrow with [facility]. Discharge instructions  reviewed with pt. Pt verbalized understanding. Pt ambulated out in stable condition. No change in pain noted upon discharge.      Marie Canavan, RN  02/19/21 [id number]

## 2021-02-22 ENCOUNTER — TELEPHONE (OUTPATIENT)
Dept: FAMILY MEDICINE CLINIC | Age: 46
End: 2021-02-22

## 2021-04-26 DIAGNOSIS — I10 ESSENTIAL HYPERTENSION: ICD-10-CM

## 2021-04-26 RX ORDER — AMLODIPINE BESYLATE AND BENAZEPRIL HYDROCHLORIDE 10; 20 MG/1; MG/1
1 CAPSULE ORAL DAILY
Qty: 90 CAPSULE | Refills: 3 | Status: SHIPPED | OUTPATIENT
Start: 2021-04-26 | End: 2021-06-09 | Stop reason: SDUPTHER

## 2021-04-26 NOTE — TELEPHONE ENCOUNTER
Requested Prescriptions     Pending Prescriptions Disp Refills    amLODIPine-benazepril (LOTREL) 10-20 MG per capsule 90 capsule 3     Sig: Take 1 capsule by mouth daily

## 2021-06-09 DIAGNOSIS — M10.00 IDIOPATHIC GOUT, UNSPECIFIED CHRONICITY, UNSPECIFIED SITE: ICD-10-CM

## 2021-06-09 DIAGNOSIS — I10 ESSENTIAL HYPERTENSION: ICD-10-CM

## 2021-06-09 RX ORDER — ALLOPURINOL 300 MG/1
300 TABLET ORAL DAILY
Qty: 90 TABLET | Refills: 3 | Status: SHIPPED | OUTPATIENT
Start: 2021-06-09 | End: 2022-09-15

## 2021-06-09 RX ORDER — AMLODIPINE BESYLATE AND BENAZEPRIL HYDROCHLORIDE 10; 20 MG/1; MG/1
1 CAPSULE ORAL DAILY
Qty: 90 CAPSULE | Refills: 3 | Status: SHIPPED | OUTPATIENT
Start: 2021-06-09 | End: 2022-05-23

## 2021-06-09 NOTE — TELEPHONE ENCOUNTER
Fax received from pharmacy for refill of Allopurinol and Amlodipine/Benezpril. Please refill if appropriate.

## 2021-08-20 ENCOUNTER — TELEPHONE (OUTPATIENT)
Dept: UROLOGY | Age: 46
End: 2021-08-20

## 2021-08-20 ENCOUNTER — HOSPITAL ENCOUNTER (EMERGENCY)
Age: 46
Discharge: HOME | End: 2021-08-20
Payer: COMMERCIAL

## 2021-08-20 ENCOUNTER — APPOINTMENT (OUTPATIENT)
Dept: CT IMAGING | Age: 46
End: 2021-08-20
Payer: COMMERCIAL

## 2021-08-20 VITALS
DIASTOLIC BLOOD PRESSURE: 75 MMHG | HEIGHT: 71 IN | RESPIRATION RATE: 17 BRPM | BODY MASS INDEX: 37.1 KG/M2 | TEMPERATURE: 98.2 F | HEART RATE: 72 BPM | WEIGHT: 265 LBS | SYSTOLIC BLOOD PRESSURE: 137 MMHG | OXYGEN SATURATION: 98 %

## 2021-08-20 DIAGNOSIS — N20.1 URETEROLITHIASIS: Primary | ICD-10-CM

## 2021-08-20 DIAGNOSIS — N20.0 NEPHROLITHIASIS: ICD-10-CM

## 2021-08-20 LAB
ALBUMIN SERPL-MCNC: 4.5 G/DL (ref 3.5–5.1)
ALP BLD-CCNC: 91 U/L (ref 38–126)
ALT SERPL-CCNC: 58 U/L (ref 11–66)
ANION GAP SERPL CALCULATED.3IONS-SCNC: 12 MEQ/L (ref 8–16)
AST SERPL-CCNC: 33 U/L (ref 5–40)
BACTERIA: ABNORMAL /HPF
BASOPHILS # BLD: 0.7 %
BASOPHILS ABSOLUTE: 0.1 THOU/MM3 (ref 0–0.1)
BILIRUB SERPL-MCNC: 0.4 MG/DL (ref 0.3–1.2)
BILIRUBIN DIRECT: < 0.2 MG/DL (ref 0–0.3)
BILIRUBIN URINE: NEGATIVE
BLOOD, URINE: ABNORMAL
BUN BLDV-MCNC: 15 MG/DL (ref 7–22)
CALCIUM SERPL-MCNC: 9.2 MG/DL (ref 8.5–10.5)
CASTS 2: ABNORMAL /LPF
CASTS UA: ABNORMAL /LPF
CHARACTER, URINE: CLEAR
CHLORIDE BLD-SCNC: 105 MEQ/L (ref 98–111)
CO2: 24 MEQ/L (ref 23–33)
COLOR: ABNORMAL
CREAT SERPL-MCNC: 1 MG/DL (ref 0.4–1.2)
CRYSTALS, UA: ABNORMAL
EOSINOPHIL # BLD: 2.6 %
EOSINOPHILS ABSOLUTE: 0.2 THOU/MM3 (ref 0–0.4)
EPITHELIAL CELLS, UA: ABNORMAL /HPF
ERYTHROCYTE [DISTWIDTH] IN BLOOD BY AUTOMATED COUNT: 12.9 % (ref 11.5–14.5)
ERYTHROCYTE [DISTWIDTH] IN BLOOD BY AUTOMATED COUNT: 39.1 FL (ref 35–45)
GFR SERPL CREATININE-BSD FRML MDRD: 81 ML/MIN/1.73M2
GLUCOSE BLD-MCNC: 122 MG/DL (ref 70–108)
GLUCOSE URINE: NEGATIVE MG/DL
HCT VFR BLD CALC: 43.1 % (ref 42–52)
HEMOGLOBIN: 14.4 GM/DL (ref 14–18)
IMMATURE GRANS (ABS): 0.04 THOU/MM3 (ref 0–0.07)
IMMATURE GRANULOCYTES: 0.5 %
KETONES, URINE: NEGATIVE
LEUKOCYTE ESTERASE, URINE: NEGATIVE
LIPASE: 40.1 U/L (ref 5.6–51.3)
LYMPHOCYTES # BLD: 21.8 %
LYMPHOCYTES ABSOLUTE: 1.7 THOU/MM3 (ref 1–4.8)
MCH RBC QN AUTO: 28.7 PG (ref 26–33)
MCHC RBC AUTO-ENTMCNC: 33.4 GM/DL (ref 32.2–35.5)
MCV RBC AUTO: 86 FL (ref 80–94)
MISCELLANEOUS 2: ABNORMAL
MONOCYTES # BLD: 7.8 %
MONOCYTES ABSOLUTE: 0.6 THOU/MM3 (ref 0.4–1.3)
NITRITE, URINE: NEGATIVE
NUCLEATED RED BLOOD CELLS: 0 /100 WBC
OSMOLALITY CALCULATION: 283.4 MOSMOL/KG (ref 275–300)
PH UA: 5 (ref 5–9)
PLATELET # BLD: 246 THOU/MM3 (ref 130–400)
PMV BLD AUTO: 9.5 FL (ref 9.4–12.4)
POTASSIUM SERPL-SCNC: 3.9 MEQ/L (ref 3.5–5.2)
PROTEIN UA: NEGATIVE
RBC # BLD: 5.01 MILL/MM3 (ref 4.7–6.1)
RBC URINE: ABNORMAL /HPF
RENAL EPITHELIAL, UA: ABNORMAL
SEG NEUTROPHILS: 66.6 %
SEGMENTED NEUTROPHILS ABSOLUTE COUNT: 5.1 THOU/MM3 (ref 1.8–7.7)
SODIUM BLD-SCNC: 141 MEQ/L (ref 135–145)
SPECIFIC GRAVITY, URINE: 1.02 (ref 1–1.03)
TOTAL PROTEIN: 7.1 G/DL (ref 6.1–8)
UROBILINOGEN, URINE: 0.2 EU/DL (ref 0–1)
WBC # BLD: 7.7 THOU/MM3 (ref 4.8–10.8)
WBC UA: ABNORMAL /HPF
YEAST: ABNORMAL

## 2021-08-20 PROCEDURE — 80053 COMPREHEN METABOLIC PANEL: CPT

## 2021-08-20 PROCEDURE — 2580000003 HC RX 258: Performed by: NURSE PRACTITIONER

## 2021-08-20 PROCEDURE — 36415 COLL VENOUS BLD VENIPUNCTURE: CPT

## 2021-08-20 PROCEDURE — 82248 BILIRUBIN DIRECT: CPT

## 2021-08-20 PROCEDURE — 96375 TX/PRO/DX INJ NEW DRUG ADDON: CPT

## 2021-08-20 PROCEDURE — 85025 COMPLETE CBC W/AUTO DIFF WBC: CPT

## 2021-08-20 PROCEDURE — 74176 CT ABD & PELVIS W/O CONTRAST: CPT

## 2021-08-20 PROCEDURE — 83690 ASSAY OF LIPASE: CPT

## 2021-08-20 PROCEDURE — 99285 EMERGENCY DEPT VISIT HI MDM: CPT

## 2021-08-20 PROCEDURE — 81001 URINALYSIS AUTO W/SCOPE: CPT

## 2021-08-20 PROCEDURE — 96374 THER/PROPH/DIAG INJ IV PUSH: CPT

## 2021-08-20 PROCEDURE — 6360000002 HC RX W HCPCS: Performed by: NURSE PRACTITIONER

## 2021-08-20 RX ORDER — KETOROLAC TROMETHAMINE 30 MG/ML
15 INJECTION, SOLUTION INTRAMUSCULAR; INTRAVENOUS ONCE
Status: COMPLETED | OUTPATIENT
Start: 2021-08-20 | End: 2021-08-20

## 2021-08-20 RX ORDER — ONDANSETRON 2 MG/ML
4 INJECTION INTRAMUSCULAR; INTRAVENOUS ONCE
Status: COMPLETED | OUTPATIENT
Start: 2021-08-20 | End: 2021-08-20

## 2021-08-20 RX ORDER — ONDANSETRON 4 MG/1
4 TABLET, ORALLY DISINTEGRATING ORAL EVERY 8 HOURS PRN
Qty: 20 TABLET | Refills: 0 | Status: SHIPPED | OUTPATIENT
Start: 2021-08-20 | End: 2021-12-21 | Stop reason: ALTCHOICE

## 2021-08-20 RX ORDER — TAMSULOSIN HYDROCHLORIDE 0.4 MG/1
0.4 CAPSULE ORAL DAILY
Qty: 30 CAPSULE | Refills: 0 | Status: SHIPPED | OUTPATIENT
Start: 2021-08-20 | End: 2021-12-21 | Stop reason: ALTCHOICE

## 2021-08-20 RX ORDER — KETOROLAC TROMETHAMINE 10 MG/1
10 TABLET, FILM COATED ORAL EVERY 6 HOURS PRN
Qty: 20 TABLET | Refills: 0 | Status: SHIPPED | OUTPATIENT
Start: 2021-08-20 | End: 2021-12-21 | Stop reason: ALTCHOICE

## 2021-08-20 RX ORDER — HYDROCODONE BITARTRATE AND ACETAMINOPHEN 5; 325 MG/1; MG/1
1 TABLET ORAL EVERY 6 HOURS PRN
Qty: 10 TABLET | Refills: 0 | Status: SHIPPED | OUTPATIENT
Start: 2021-08-20 | End: 2021-08-23

## 2021-08-20 RX ORDER — 0.9 % SODIUM CHLORIDE 0.9 %
1000 INTRAVENOUS SOLUTION INTRAVENOUS ONCE
Status: COMPLETED | OUTPATIENT
Start: 2021-08-20 | End: 2021-08-20

## 2021-08-20 RX ADMIN — ONDANSETRON 4 MG: 2 INJECTION INTRAMUSCULAR; INTRAVENOUS at 04:31

## 2021-08-20 RX ADMIN — KETOROLAC TROMETHAMINE 15 MG: 30 INJECTION, SOLUTION INTRAMUSCULAR; INTRAVENOUS at 04:31

## 2021-08-20 RX ADMIN — SODIUM CHLORIDE 1000 ML: 9 INJECTION, SOLUTION INTRAVENOUS at 04:31

## 2021-08-20 ASSESSMENT — PAIN DESCRIPTION - LOCATION: LOCATION: FLANK

## 2021-08-20 ASSESSMENT — PAIN SCALES - GENERAL
PAINLEVEL_OUTOF10: 3
PAINLEVEL_OUTOF10: 3

## 2021-08-20 ASSESSMENT — PAIN DESCRIPTION - ORIENTATION: ORIENTATION: LEFT

## 2021-08-20 NOTE — ED TRIAGE NOTES
Pt presents to ED with c/o left flank pain. Pt states he woke up to a dull pain, 3/10,  around 2:30. Pt states the pain shortly increased to a 7/10. Pt states he has been urinating okay and denies any blood in urine or history of kidney stones. Pt states he is having bowel movements regularly. Pt states his appetite has not changed.  Pt states he got nauseous on the way over but did not feel nauseous upon arrival.

## 2021-08-20 NOTE — ED NOTES
Pt and vs reassessed. RR even and unlabored. No distress noted. Pt resting in bed with eyes closed. Pt medicated per MAR.  Will continue to monitor     Justen العراقي, ANDREA  08/20/21 [id number]

## 2021-08-20 NOTE — ED NOTES
Pt and vs reassessed. RR even and unlabored. Pt resting in bed. No distress noted.  Will continue to monitor      Justen العراقي, ANDREA  08/20/21 1842

## 2021-08-20 NOTE — ED NOTES
Pt and vs reassessed. RR even and unlabored. Pt updated on POC. No distress noted.  Will continue to monitor     Justen العراقي, ANDREA  08/20/21 0992

## 2021-08-20 NOTE — TELEPHONE ENCOUNTER
Patient called left voicemail. New patient- kidney stone. Was seen via ER today. Had CT scan done  Wants seen on Monday morning if possible. Mild left-sided hydronephrosis with 2 proximal left ureteral calculi    measuring 2-3 mm each. Multiple nonobstructing bilateral renal calculi are also noted. Splenomegaly. I am not sure how the stone process is handled. Should I put him with [facility] or [facility] on Monday? ?  Please advise.

## 2021-08-20 NOTE — ED NOTES
ED nurse-to-nurse bedside report    Chief Complaint   Patient presents with    Flank Pain      LOC: alert and orientated to name, place, date  Vital signs   Vitals:    08/20/21 0400 08/20/21 0434 08/20/21 0506 08/20/21 0600   BP: 130/83 121/75 112/75 133/83   Pulse: 72 67 75 69   Resp: 18 18 17 18   Temp: 98.2 °F (36.8 °C)      TempSrc: Oral      SpO2: 99% 96% 95% 99%   Weight: 265 lb (120.2 kg)      Height: 5' 11\" (1.803 m)         Pain:    Pain Interventions: toradol    Pain Goal: 2    Oxygen: No    Current needs required none     Telemetry: No  LDAs:   Peripheral IV 08/20/21 Right Antecubital (Active)   Site Assessment Clean;Dry; Intact 08/20/21 0601   Line Status Infusing 08/20/21 0601   Dressing Status Clean;Dry; Intact 08/20/21 0601   Dressing Intervention New 08/20/21 0411     Continuous Infusions:   Mobility: Independent  Pickard Fall Risk Score: No flowsheet data found.   Fall Interventions: sidrails up, bed in lowest position, call light in reach, wheels locked    Report given to: [facility], THE RN       Dre Ortiz RN  08/20/21 6387

## 2021-08-23 ENCOUNTER — TELEPHONE (OUTPATIENT)
Dept: UROLOGY | Age: 46
End: 2021-08-23

## 2021-08-23 ENCOUNTER — OFFICE VISIT (OUTPATIENT)
Dept: UROLOGY | Age: 46
End: 2021-08-23
Payer: COMMERCIAL

## 2021-08-23 VITALS
WEIGHT: 263 LBS | DIASTOLIC BLOOD PRESSURE: 80 MMHG | BODY MASS INDEX: 36.82 KG/M2 | SYSTOLIC BLOOD PRESSURE: 122 MMHG | HEIGHT: 71 IN

## 2021-08-23 DIAGNOSIS — N20.0 KIDNEY STONE: Primary | ICD-10-CM

## 2021-08-23 PROCEDURE — 99204 OFFICE O/P NEW MOD 45 MIN: CPT | Performed by: NURSE PRACTITIONER

## 2021-08-23 ASSESSMENT — ENCOUNTER SYMPTOMS
CHEST TIGHTNESS: 0
RHINORRHEA: 0
NAUSEA: 0
COUGH: 0
ABDOMINAL PAIN: 0
BACK PAIN: 0
VOMITING: 0
EYE REDNESS: 0

## 2021-08-23 NOTE — PROGRESS NOTES
Hayley Clarke is a 41 y.o. male is a new patient who was referred here by [facility] ED. Hayley Clarke was seen in the ED on 8- due to left flank pain with nausea. The pain started earlier that morning. It is located in the left flank and is constant. It is described as an ache. There are no aggravating factors. There is also associated nausea & vomiting. CT abd/pelvis showed mild left hydronephrosis with 2 proximal left ureteral stones measuring 2-3 mm each. Multiple nonobstructive bilateral renal stones. Their pain was controlled so they were discharged with pain medication and Flomax. They deny dysuria, hematuria, fever, or chills. He reports no further pain since ER visit. Taking Flomax daily and Toradol as needed. CRT 1.0, WBC 7.7  UA with blood only. No prior hx of stones. Hx of gout, on allopuinol. Past Medical History:   Diagnosis Date    Gout     Hypertension        Past Surgical History:   Procedure Laterality Date    TONSILLECTOMY      as a child       Current Outpatient Medications on File Prior to Visit   Medication Sig Dispense Refill    ketorolac (TORADOL) 10 MG tablet Take 1 tablet by mouth every 6 hours as needed for Pain 20 tablet 0    tamsulosin (FLOMAX) 0.4 MG capsule Take 1 capsule by mouth daily 30 capsule 0    ondansetron (ZOFRAN ODT) 4 MG disintegrating tablet Take 1 tablet by mouth every 8 hours as needed for Nausea or Vomiting 20 tablet 0    amLODIPine-benazepril (LOTREL) 10-20 MG per capsule Take 1 capsule by mouth daily 90 capsule 3    allopurinol (ZYLOPRIM) 300 MG tablet Take 1 tablet by mouth daily 90 tablet 3    indomethacin (INDOCIN) 50 MG capsule Take 1 capsule by mouth every 8 hours as needed (gout) 30 capsule 2    HYDROcodone-acetaminophen (NORCO) 5-325 MG per tablet Take 1 tablet by mouth every 6 hours as needed for Pain for up to 3 days. WARNING: This medication may make you drowsy. Do not operate heavy machinery or motor vehicles during use.  (Patient not taking: Reported on 8/23/2021) 10 tablet 0     No current facility-administered medications on file prior to visit. No Known Allergies    Social History     Tobacco Use    Smoking status: Never Smoker    Smokeless tobacco: Never Used   Substance Use Topics    Alcohol use: No    Drug use: No       Family History   Problem Relation Age of Onset    High Blood Pressure Father        Review of Systems   Constitutional: Negative for chills, fatigue, fever or weight loss. Eyes: Denies reported visual changes. Cardiovascular: Negative for chest pain, palpitations, tachycardia or edema. Respiratory: Denies cough or SOB. GI: Denies any constipation or diarrhea. Nausea and vomiting a few days ago. : see HPI. Musculoskeletal: Patient denies low back pain or painful or reduced range of  motion of the back or extremities. Neurological: The patient denies any symptoms of neurological impairment or TIA's; no history of stroke. Lymphatic: Denies swollen glands in neck, axillary or inguinal areas. Psychiatric: Denies anxiety or depression. Skin: Denies rash or lesions. The remainder of the complete ROS is negative. Physical Exam  Nursing note and vitals reviewed. Constitutional: Alert and oriented times 3, no acute distress and cooperative to examination with appropriate mood and affect. HENT:   Head:        Normocephalic and atraumatic. Mouth/Throat:         Mucous membranes are normal.   Eyes:         EOM are normal. No scleral icterus. PERRLA. Cardiovascular:        Normal rate, regular rhythm, S1 S2 heart sounds. No murmurs, rub, or gallops. Pulmonary/Chest:    Normal respiratory rate and rhthym. No use of accessory muscles. Abdominal:         Soft. No tenderness. No rebound or guarding. No CVA tenderness. Musculoskeletal:         Normal range of motion. No evidence of edema or tenderness of lower extremities. Extremities: No cyanosis, clubbing, or edema present.   Neurological: Alert and oriented. Skin:       Skin color, texture, turgor normal. No rashes or lesions. Psychiatric:        Normal mood and affect. Labs   WBC:    Lab Results   Component Value Date    WBC 7.7 08/20/2021     Hemoglobin/Hematocrit:    Lab Results   Component Value Date    HGB 14.4 08/20/2021    HCT 43.1 08/20/2021     BMP:    Lab Results   Component Value Date     08/20/2021    K 3.9 08/20/2021     08/20/2021    CO2 24 08/20/2021    BUN 15 08/20/2021    LABALBU 4.5 08/20/2021    CREATININE 1.0 08/20/2021    CALCIUM 9.2 08/20/2021    LABGLOM 81 08/20/2021     PT/INR:  No results found for: PROTIME, INR  PTT:  No results found for: APTT[APTT  Urinalysis:   Lab Results   Component Value Date    BLOODU LARGE 08/20/2021    NITRU NEGATIVE 08/20/2021    LEUKOCYTESUR NEGATIVE 08/20/2021    COLORU DARK YELLOW 08/20/2021    WBCUA 0-2 08/20/2021    BACTERIA NONE SEEN 08/20/2021     Urine Culture:  No results found for: Gerber Best  Blood Culture:  No results found for: [facility]      Radiology  The patient has had a CT Stone Protocol which I have reviewed along with its accompanying report. The study demonstrates :    Mild left-sided hydronephrosis with 2 proximal left ureteral calculi    measuring 2-3 mm each. Multiple nonobstructing bilateral renal calculi are also noted. Splenomegaly.               Assessment    Left flank pain  Two proximal left ureteral stones with mild hydronephrosis  Bilateral non obstructive renal stones      Plan    He has 2 small left proximal ureteral stones. Discussed stone management. Can continue trial of passage vs ureteroscopy. Pt is going on business trip out of town, leaving today. Discussed placing ureteral stent prior to leaving on business. He was reluctant to have this done as he is currently feeling well, and would cancel his business trip if he had stent placement after discussing expectations after stent placement. He is on Flomax, and Toradol.  He denies fever or chills or pain since ER visit. Hasn't seen stones pass. He elected for trial of passage. He was advised to go to ER for uncontrolled pain, fever or chills. He will need metabolic work up in the future. He will fu in 2-3 weeks with Renal US and KUB prior. He will call with any questions or concerns prior to appt.     LORRAINE Domingo - CNP, CNP  8/23/2021 12:11 PM

## 2021-08-23 NOTE — TELEPHONE ENCOUNTER
Patient scheduled for US Renal Complete  at [facility] MR on 9/8/21 arrival of 8:15 am for a 8:30 am scan time with no carbonated beverages the day of and arrive well hydrated. Patient to do KUB at that time. Patient advised of instructions.   Order mailed/given to patient

## 2021-08-23 NOTE — ED PROVIDER NOTES
[facility] Emergency [address] [place] [address]       Chief Complaint   Patient presents with    Flank Pain       Nurses Notes reviewed and I agree except as noted in the HPI. HISTORY OF PRESENT ILLNESS    Jesse Maddox cherie 41 y.o. male who presents to the ED for evaluation of left flank pain. Symptoms started around 0230. Pain initially was mild but then became worse. No dysuria. No hematuria, no fever. Mild intermittent nausea. Pain does not radiate. HPI was provided by the patient    REVIEW OF SYSTEMS     Review of Systems   Constitutional: Negative for chills, fatigue and fever. HENT: Negative for congestion, ear discharge, ear pain, postnasal drip and rhinorrhea. Eyes: Negative for redness. Respiratory: Negative for cough and chest tightness. Cardiovascular: Negative for chest pain and leg swelling. Gastrointestinal: Negative for abdominal pain, nausea and vomiting. Genitourinary: Positive for flank pain. Negative for difficulty urinating, dysuria, enuresis and hematuria. Musculoskeletal: Negative for back pain and joint swelling. Skin: Negative for rash. Neurological: Negative for dizziness, light-headedness, numbness and headaches. Psychiatric/Behavioral: Negative for agitation, behavioral problems and confusion. All other systems negative except as noted. PAST MEDICAL HISTORY     Past Medical History:   Diagnosis Date    Gout     Hypertension        SURGICALHISTORY      has a past surgical history that includes Tonsillectomy.     CURRENT MEDICATIONS       Discharge Medication List as of 8/20/2021  8:27 AM      CONTINUE these medications which have NOT CHANGED    Details   amLODIPine-benazepril (LOTREL) 10-20 MG per capsule Take 1 capsule by mouth daily, Disp-90 capsule, R-3Normal      allopurinol (ZYLOPRIM) 300 MG tablet Take 1 tablet by mouth daily, Disp-90 tablet, R-3Normal      indomethacin (INDOCIN) 50 MG capsule Take 1 capsule by mouth every 8 his pulse is 72. His respiration is 17 and oxygen saturation is 98%. Physical Exam  Vitals and nursing note reviewed. Constitutional:       General: He is not in acute distress. Appearance: He is well-developed. He is not diaphoretic. HENT:      Head: Normocephalic and atraumatic. Nose: Nose normal.      Mouth/Throat:      Mouth: Mucous membranes are moist.      Pharynx: Oropharynx is clear. Eyes:      General:         Right eye: No discharge. Left eye: No discharge. Conjunctiva/sclera: Conjunctivae normal.   Neck:      Trachea: No tracheal deviation. Cardiovascular:      Rate and Rhythm: Normal rate and regular rhythm. Heart sounds: Normal heart sounds. No murmur heard. No gallop. Comments: Normal capillary refill  Pulmonary:      Effort: Pulmonary effort is normal. No respiratory distress. Breath sounds: Normal breath sounds. No stridor. Abdominal:      General: Bowel sounds are normal.      Palpations: Abdomen is soft. Tenderness: There is left CVA tenderness. Musculoskeletal:         General: No tenderness or deformity. Normal range of motion. Cervical back: Normal range of motion. Skin:     General: Skin is warm and dry. Capillary Refill: Capillary refill takes less than 2 seconds. Coloration: Skin is not pale. Findings: No erythema or rash. Neurological:      General: No focal deficit present. Mental Status: He is alert and oriented to person, place, and time. Cranial Nerves: No cranial nerve deficit.    Psychiatric:         Mood and Affect: Mood normal.         Behavior: Behavior normal.         DIFFERENTIAL DIAGNOSIS:   Uti, kidney stone, muscle strain    DIAGNOSTIC RESULTS     EKG: All EKG's are interpreted by the Emergency Department Physician who eithersigns or Co-signs this chart in the absence of a cardiologist.        RADIOLOGY: non-plainfilm images(s) such as CT, Ultrasound and MRI are read by the radiologist. Plain radiographic images are visualized and preliminarily interpreted by the emergency physician unless otherwise stated below. CT ABDOMEN PELVIS WO CONTRAST Additional Contrast? None   Final Result   Mild left-sided hydronephrosis with 2 proximal left ureteral calculi    measuring 2-3 mm each. Multiple nonobstructing bilateral renal calculi are also noted. Splenomegaly. This document has been electronically signed by: Kartik Wheat MD on    08/20/2021 08:03 AM      All CTs at this facility use dose modulation techniques and iterative    reconstructions, and/or weight-based dosing   when appropriate to reduce radiation to a low as reasonably achievable. LABS:   Labs Reviewed   BASIC METABOLIC PANEL - Abnormal; Notable for the following components:       Result Value    Glucose 122 (*)     All other components within normal limits   GLOMERULAR FILTRATION RATE, ESTIMATED - Abnormal; Notable for the following components:    Est, Glom Filt Rate 81 (*)     All other components within normal limits   URINE WITH REFLEXED MICRO - Abnormal; Notable for the following components:    Blood, Urine LARGE (*)     Color, UA DARK YELLOW (*)     All other components within normal limits   CBC WITH AUTO DIFFERENTIAL   LIPASE   HEPATIC FUNCTION PANEL   ANION GAP   OSMOLALITY       EMERGENCY DEPARTMENT COURSE:   Vitals:    Vitals:    08/20/21 0434 08/20/21 0506 08/20/21 0600 08/20/21 0750   BP: 121/75 112/75 133/83 137/75   Pulse: 67 75 69 72   Resp: 18 17 18 17   Temp:       TempSrc:       SpO2: 96% 95% 99% 98%   Weight:       Height:              MDM                         MDM    Patient was seen in the ER for left flank pain. He was treated with ivf, toradol and zofran. Appropriate labs and imaging are ordered and reviewed. Care is transferred to [facility] at the end of my shift. Please refer to her notes.       Medications   0.9 % sodium chloride bolus (0 mLs Intravenous Stopped 8/20/21 0749)   ketorolac (TORADOL) injection 15 mg (15 mg Intravenous Given 8/20/21 0431)   ondansetron (ZOFRAN) injection 4 mg (4 mg Intravenous Given 8/20/21 0431)         Patient was seen independently by myself. The patient's final impression and disposition and plan was determined by myself. Strict return precautions and follow up instructions were discussed with the patient prior to discharge, with which the patient agrees. Physical assessment findings, diagnostic testing(s) if applicable, and vital signs reviewed with patient/patient representative. Questions answered. Medications asdirected, including OTC medications for supportive care. Education provided on medications. Differential diagnosis(s) discussed with patient/patient representative. Home care/self care instructions reviewed withpatient/patient representative. Patient is to follow-up with family care provider in 2-3 days if no improvement. Patient is to go to the emergency department if symptoms worsen. Patient/patient representative isaware of care plan, questions answered, verbalizes understanding and is in agreement. CRITICAL CARE:   None    CONSULTS:  None    PROCEDURES:  None    FINAL IMPRESSION     1. Ureterolithiasis    2. Nephrolithiasis          DISPOSITION/PLAN   DISPOSITION Decision To Discharge 08/20/2021 08:25:16 AM      PATIENT REFERREDTO:  [facility]  [address] [facility]  Call in 3 days      [address] [facility] [address] [address] EMERGENCY DEPT  [address]  [phone]    If symptoms worsen      DISCHARGE MEDICATIONS:  Discharge Medication List as of 8/20/2021  8:27 AM      START taking these medications    Details   ketorolac (TORADOL) 10 MG tablet Take 1 tablet by mouth every 6 hours as needed for Pain, Disp-20 tablet, R-0Print      tamsulosin (FLOMAX) 0.4 MG capsule Take 1 capsule by mouth daily, Disp-30 capsule, R-0Print      ondansetron (ZOFRAN ODT) 4 MG disintegrating tablet Take 1 tablet by mouth every 8 hours as needed for Nausea or Vomiting, Disp-20 tablet, R-0Print      HYDROcodone-acetaminophen (NORCO) 5-325 MG per tablet Take 1 tablet by mouth every 6 hours as needed for Pain for up to 3 days. WARNING: This medication may make you drowsy.  Do not operate heavy machinery or motor vehicles during use., Disp-10 tablet, R-0Print             (Please note that portions of this note were completed with a voice recognition program.  Efforts were made to edit the dictations but occasionally words are mis-transcribed.)         LORRAINE Soria CNP, APRN - CNP  08/23/21 8209

## 2021-09-08 ENCOUNTER — HOSPITAL ENCOUNTER (OUTPATIENT)
Age: 46
Discharge: HOME | End: 2021-09-08
Payer: COMMERCIAL

## 2021-09-08 ENCOUNTER — HOSPITAL ENCOUNTER (OUTPATIENT)
Dept: ULTRASOUND IMAGING | Age: 46
Discharge: HOME | End: 2021-09-08
Payer: COMMERCIAL

## 2021-09-08 ENCOUNTER — HOSPITAL ENCOUNTER (OUTPATIENT)
Dept: GENERAL RADIOLOGY | Age: 46
Discharge: HOME | End: 2021-09-08
Payer: COMMERCIAL

## 2021-09-08 DIAGNOSIS — N20.0 KIDNEY STONE: ICD-10-CM

## 2021-09-08 PROCEDURE — 76775 US EXAM ABDO BACK WALL LIM: CPT

## 2021-09-08 PROCEDURE — 74018 RADEX ABDOMEN 1 VIEW: CPT

## 2021-09-14 NOTE — PROGRESS NOTES
Bryanna Arrington was seen in the ED on 8- due to left flank pain with nausea. CT abd/pelvis showed mild left hydronephrosis with 2 proximal left ureteral stones measuring 2-3 mm each. Multiple nonobstructive bilateral renal stones. Their pain was controlled so they were discharged with pain medication and Flomax. CRT 1.0, WBC 7.7  UA with blood only. No prior hx of stones. Hx of gout, on allopuinol. Symptoms were controlled at time of follow up, he elected trial of passage, here for fu to review KUB and US. Denies having any significant pain, nausea or vomiting since time of ER visit. He denies any fever or chills. Denies seeing or feeling stone pass. Urine dip negative today. Past Medical History:   Diagnosis Date    Gout     Hypertension        Past Surgical History:   Procedure Laterality Date    TONSILLECTOMY      as a child       Current Outpatient Medications on File Prior to Visit   Medication Sig Dispense Refill    ketorolac (TORADOL) 10 MG tablet Take 1 tablet by mouth every 6 hours as needed for Pain 20 tablet 0    tamsulosin (FLOMAX) 0.4 MG capsule Take 1 capsule by mouth daily 30 capsule 0    ondansetron (ZOFRAN ODT) 4 MG disintegrating tablet Take 1 tablet by mouth every 8 hours as needed for Nausea or Vomiting 20 tablet 0    amLODIPine-benazepril (LOTREL) 10-20 MG per capsule Take 1 capsule by mouth daily 90 capsule 3    allopurinol (ZYLOPRIM) 300 MG tablet Take 1 tablet by mouth daily 90 tablet 3    indomethacin (INDOCIN) 50 MG capsule Take 1 capsule by mouth every 8 hours as needed (gout) 30 capsule 2     No current facility-administered medications on file prior to visit.        No Known Allergies    Social History     Tobacco Use    Smoking status: Never Smoker    Smokeless tobacco: Never Used   Substance Use Topics    Alcohol use: No    Drug use: No       Family History   Problem Relation Age of Onset    High Blood Pressure Father        Review of Systems   Constitutional: Negative for chills, fatigue, fever or weight loss. Eyes: Denies reported visual changes. Cardiovascular: Negative for chest pain, palpitations, tachycardia or edema. Respiratory: Denies cough or SOB. GI: Denies any constipation or diarrhea. Nausea and vomiting a few days ago. : see HPI. Musculoskeletal: Patient denies low back pain or painful or reduced range of  motion of the back or extremities. Neurological: The patient denies any symptoms of neurological impairment or TIA's; no history of stroke. Lymphatic: Denies swollen glands in neck, axillary or inguinal areas. Psychiatric: Denies anxiety or depression. Skin: Denies rash or lesions. The remainder of the complete ROS is negative. Physical Exam  Nursing note and vitals reviewed. Constitutional: Alert and oriented times 3, no acute distress and cooperative to examination with appropriate mood and affect. HENT:   Head:        Normocephalic and atraumatic. Mouth/Throat:         Mucous membranes are normal.   Eyes:         EOM are normal. No scleral icterus. PERRLA. Pulmonary/Chest:    Normal respiratory rate and rhthym. No use of accessory muscles. Abdominal:         Soft. No tenderness. No rebound or guarding. No CVA tenderness. Musculoskeletal:         Normal range of motion. Extremities: No cyanosis, clubbing, or edema present. Neurological:        Alert and oriented. Skin:       Skin color, texture, turgor normal. No rashes or lesions. Psychiatric:        Normal mood and affect.      Labs   WBC:    Lab Results   Component Value Date    WBC 7.7 08/20/2021     Hemoglobin/Hematocrit:    Lab Results   Component Value Date    HGB 14.4 08/20/2021    HCT 43.1 08/20/2021     BMP:    Lab Results   Component Value Date     08/20/2021    K 3.9 08/20/2021     08/20/2021    CO2 24 08/20/2021    BUN 15 08/20/2021    LABALBU 4.5 08/20/2021    CREATININE 1.0 08/20/2021    CALCIUM 9.2 08/20/2021    LABGLOM 81 08/20/2021     PT/INR:  No results found for: PROTIME, INR  PTT:  No results found for: APTT[APTT  Urinalysis:   Lab Results   Component Value Date    BLOODU Negative 09/15/2021    BLOODU LARGE 08/20/2021    NITRU Negative 09/15/2021    LEUKOCYTESUR NEGATIVE 08/20/2021    COLORU Yellow 09/15/2021    COLORU DARK YELLOW 08/20/2021    WBCUA 0-2 08/20/2021    BACTERIA NONE SEEN 08/20/2021     Urine Culture:  No results found for: Allison Lyn  Blood Culture:  No results found for: [facility]      Radiology  The patient has had a CT Stone Protocol which I have reviewed along with its accompanying report. The study demonstrates :    Mild left-sided hydronephrosis with 2 proximal left ureteral calculi    measuring 2-3 mm each. Multiple nonobstructing bilateral renal calculi are also noted. Splenomegaly.                   KUB:       TECHNIQUE: 2 AP views of the abdomen were obtained in the supine position.       FINDINGS:        Several densities project over the bilateral renal shadows, likely representing intrarenal kidney stones. The largest on the left measures approximately 7 mm. The largest on the right is approximately 6 mm. No radiodense calculi project over the course    of the ureters.       There are no distended bowel loops. There are no displaced bowel loops.  There is no gross free air. No suspicious osseous lesions are present.           Impression   1. Calculi project over the renal shadows on both sides. Renal US:     COMPARISON: No comparison available.       TECHNIQUE: Multiple sonographic images of both kidneys were obtained.  Images of the urinary bladder were also obtained.       FINDINGS:       RIGHT KIDNEY - 12.2 x 5.9 x 6.3 cm    Resistive Index - 0.64    Cortical Thickness - 1.5 cm        LEFT KIDNEY - 12.5 x 6.2 x 6.4 cm    Resistive Index - 0.59    Cortical Thickness - 1.7 cm        URINARY BLADDER    Pre-Void - 127 mL    Post-Void - 12.6 mL            KIDNEYS:  Both kidneys are normal in size and contour.  The resistive indices are normal.        MASS/CYST: No solid or cystic lesion of either kidney is seen.          HYDRONEPHROSIS:  Mild fluid distention of the left renal pelvis but no ekta hydronephrosis.         CALCULI:  Small nonobstructing 8 mm calculus lower pole left kidney.        BLADDER:  The urinary bladder is unremarkable. .                Impression   Nonobstructing 8 mm calculus lower pole left kidney. No acute findings.               Assessment    Left flank pain- resolved  Two proximal left ureteral stones with mild hydronephrosis  Bilateral non obstructive renal stones      Plan    He has 2 small left proximal ureteral stones. Hasn't needed pain medication. Still taking Flomax. Hasn't seen stones pass. Would like to continue to pass on his own. He will call if he has pain again, nausea or vomiting. We discussed ESWL for renal stones. Will continue to monitor for now. KUB and [facility] in 3-4 months.        LORRAINE Vallecillo - CNP, CNP  9/15/2021 8:09 AM

## 2021-09-15 ENCOUNTER — OFFICE VISIT (OUTPATIENT)
Dept: UROLOGY | Age: 46
End: 2021-09-15
Payer: COMMERCIAL

## 2021-09-15 VITALS
BODY MASS INDEX: 36.54 KG/M2 | DIASTOLIC BLOOD PRESSURE: 88 MMHG | HEIGHT: 71 IN | WEIGHT: 261 LBS | SYSTOLIC BLOOD PRESSURE: 144 MMHG | HEART RATE: 76 BPM

## 2021-09-15 DIAGNOSIS — N20.0 KIDNEY STONE: Primary | ICD-10-CM

## 2021-09-15 LAB
BILIRUBIN URINE: NEGATIVE
BLOOD URINE, POC: NEGATIVE
CHARACTER, URINE: CLEAR
COLOR, URINE: YELLOW
GLUCOSE URINE: NEGATIVE MG/DL
KETONES, URINE: NEGATIVE
LEUKOCYTE CLUMPS, URINE: NEGATIVE
NITRITE, URINE: NEGATIVE
PH, URINE: 5 (ref 5–9)
PROTEIN, URINE: NEGATIVE MG/DL
SPECIFIC GRAVITY, URINE: 1.02 (ref 1–1.03)
UROBILINOGEN, URINE: 0.2 EU/DL (ref 0–1)

## 2021-09-15 PROCEDURE — 99213 OFFICE O/P EST LOW 20 MIN: CPT | Performed by: NURSE PRACTITIONER

## 2021-09-15 PROCEDURE — 81003 URINALYSIS AUTO W/O SCOPE: CPT | Performed by: NURSE PRACTITIONER

## 2021-11-30 ENCOUNTER — TELEPHONE (OUTPATIENT)
Dept: FAMILY MEDICINE CLINIC | Age: 46
End: 2021-11-30

## 2021-11-30 DIAGNOSIS — R68.2 DRY MOUTH: ICD-10-CM

## 2021-11-30 DIAGNOSIS — J34.2 DNS (DEVIATED NASAL SEPTUM): ICD-10-CM

## 2021-11-30 DIAGNOSIS — R06.83 SNORING: Primary | ICD-10-CM

## 2021-11-30 NOTE — TELEPHONE ENCOUNTER
----- Message from April Clay sent at 11/30/2021  8:51 AM EST -----  Subject: Referral Request    QUESTIONS   Reason for referral request? Pt is requesting a referral to see a ENT   doctor   Has the physician seen you for this condition before? No   Preferred Specialist (if applicable)? Do you already have an appointment scheduled? No  Additional Information for Provider?   ---------------------------------------------------------------------------  --------------  CALL BACK INFO  What is the best way for the office to contact you?  OK to leave message on   voicemail  Preferred Call Back Phone Number? [phone] Detail Level: Detailed

## 2021-11-30 NOTE — TELEPHONE ENCOUNTER
Spoke with patient,  referral reason snoring and dry mouth. Okay with seeing [facility]NT. If no call back patient aware that referral was approved and that ENT will call him to schedule.

## 2021-12-17 ENCOUNTER — HOSPITAL ENCOUNTER (OUTPATIENT)
Dept: GENERAL RADIOLOGY | Age: 46
Discharge: HOME | End: 2021-12-17
Payer: COMMERCIAL

## 2021-12-17 ENCOUNTER — HOSPITAL ENCOUNTER (OUTPATIENT)
Age: 46
Discharge: HOME | End: 2021-12-17
Payer: COMMERCIAL

## 2021-12-17 DIAGNOSIS — N20.0 KIDNEY STONE: ICD-10-CM

## 2021-12-17 PROCEDURE — 74018 RADEX ABDOMEN 1 VIEW: CPT

## 2021-12-21 ENCOUNTER — OFFICE VISIT (OUTPATIENT)
Dept: ENT CLINIC | Age: 46
End: 2021-12-21
Payer: COMMERCIAL

## 2021-12-21 VITALS
WEIGHT: 270 LBS | SYSTOLIC BLOOD PRESSURE: 130 MMHG | RESPIRATION RATE: 14 BRPM | BODY MASS INDEX: 37.66 KG/M2 | OXYGEN SATURATION: 97 % | HEART RATE: 87 BPM | TEMPERATURE: 97.8 F | DIASTOLIC BLOOD PRESSURE: 80 MMHG

## 2021-12-21 DIAGNOSIS — H93.13 TINNITUS OF BOTH EARS: ICD-10-CM

## 2021-12-21 DIAGNOSIS — G47.30 SLEEP APNEA, UNSPECIFIED TYPE: Primary | ICD-10-CM

## 2021-12-21 PROCEDURE — 99203 OFFICE O/P NEW LOW 30 MIN: CPT | Performed by: OTOLARYNGOLOGY

## 2021-12-21 NOTE — PROGRESS NOTES
[address], NOSE AND THROAT  [place] [address] [facility]  Dept: [phone]  Dept Fax: [phone]: [phone]       Dear Al Ramires DO, thank you for referring Christy Lynch in consultation for a chief complaint of: snoring . My full evaluation is as follows:     HPI:     As you recall, Christy Lynch is a 40 y.o. male who presents today for snoring. Recently his snoring has gotten worse. He has gained 10-15 lbs. His wife notices some apnea as well, although he thinks he gets normal sleep quality. No issues with nasal breathing. History:     No Known Allergies  Current Outpatient Medications   Medication Sig Dispense Refill    amLODIPine-benazepril (LOTREL) 10-20 MG per capsule Take 1 capsule by mouth daily 90 capsule 3    allopurinol (ZYLOPRIM) 300 MG tablet Take 1 tablet by mouth daily 90 tablet 3    indomethacin (INDOCIN) 50 MG capsule Take 1 capsule by mouth every 8 hours as needed (gout) (Patient not taking: Reported on 12/21/2021) 30 capsule 2     No current facility-administered medications for this visit. Past Medical History:   Diagnosis Date    Gout     Hypertension       Past Surgical History:   Procedure Laterality Date    TONSILLECTOMY      as a child     Family History   Problem Relation Age of Onset    High Blood Pressure Father      Social History     Tobacco Use    Smoking status: Never Smoker    Smokeless tobacco: Never Used   Substance Use Topics    Alcohol use: No       All of the past medical history, past surgical history, family history,social history, allergies and current medications were reviewed with the patient. Review of Systems      A complete review of systems was obtained by the patient intake form, which is attached to this visit.     Objective:   /80 (Site: Right Upper Arm, Position: Sitting)   Pulse 87   Temp 97.8 °F (36.6 °C)   Resp 14   Wt 270 lb (122.5 kg) SpO2 97%   BMI 37.66 kg/m²     PHYSICAL EXAM  ABNORMAL OTOLARYNGOLOGIC EXAM FINDINGS: BITH. Mallampati 1. OTHER PERTINENT EXAM FINDINGS:  GENERAL: Awake, NAD, Non-toxic appearing. Normal voice quality  NEUROLOGICAL: GCS 15, Cranial nerves II-VI, VIII-XII grossly intact,  Facial nerve (VII) w/ House-Brackman Grade 1 of 6 bilaterally, No evidence of nystagmus or gross asymmetry    EARS: Pinna well-formed,  EAC non-stenotic w/o cerumen impaction AU,  TM's intact AU w/o effusion or active infection appreciated  EYES: EOMI, No ptosis appreciated   NOSE: Dorsum w/o scar or deformity,  No mucopurulence appreciated, Patent nasal airways bilaterally. No inferior turbinate hypertrophy. No septal deviation. ORAL CAVITY: No mucosal masses/lesions appreciated, Tongue with FROM. Appropriate DEANDRA w/o trismus. OROPHARYNX: No mucosal masses or lesions appreciated. Symmetric palatal elevation w/o draping. NECK: Soft, supple. No crepitus or masses appreciated,  Trachea midline. No thyromegaly or thyroid tenderness. Data: The relevant prior clinic notes were reviewed today, including the referring physicians notes. Imaging: None       Assessment & Plan   Alisha Fung is a 40 y.o. male with:   1. Sleep apnea, unspecified type    2. Tinnitus of both ears       I think he should be evaluated for DOROTHY. We can try a dental appliance to help with the snoring in the meantime. I think loosing some weight will also improve the snoring. If he just snores, and does not have DOROTHY, and the dental appliance does not help, we could try RFA of the soft palate . The patient has bilateral tinnitus, likely secondary to the SNHL. -I reviewed the etiology of the tinnitus with him  -We have discussed options of observation, hearing aids, masking, or medical managment.  -I will hold off on medications at this time but will pursue this in the future if symptoms worsen. No follow-ups on file.            Thank you for involving me in this patient's care. Please do not hesitate to call with any questions or concerns. Sincerely,    Duarte Navarro M.D. Otolaryngology-Head and Neck Surgery    **This report has been created using voice recognition software. It may contain minor errors which are inherent in voice recognition technology. **

## 2021-12-22 ENCOUNTER — OFFICE VISIT (OUTPATIENT)
Dept: UROLOGY | Age: 46
End: 2021-12-22
Payer: COMMERCIAL

## 2021-12-22 VITALS
BODY MASS INDEX: 37.8 KG/M2 | DIASTOLIC BLOOD PRESSURE: 84 MMHG | HEIGHT: 71 IN | WEIGHT: 270 LBS | SYSTOLIC BLOOD PRESSURE: 136 MMHG

## 2021-12-22 DIAGNOSIS — N20.0 KIDNEY STONE: Primary | ICD-10-CM

## 2021-12-22 PROCEDURE — 99214 OFFICE O/P EST MOD 30 MIN: CPT | Performed by: NURSE PRACTITIONER

## 2021-12-22 NOTE — PROGRESS NOTES
Rozetta Lefort was seen in the ED on 8- due to left flank pain with nausea. CT abd/pelvis showed mild left hydronephrosis with 2 proximal left ureteral stones measuring 2-3 mm each. Multiple nonobstructive bilateral renal stones. No prior hx of stones. Hx of gout, on allopuinol. Symptoms were controlled at time of follow up, he elected trial of passage, here for fu to review KUB. Has renal stones visible on KUB. We discussed litholink as well. Low urine volume  Hyperoxaluria  Low urine PH, normal urine citrate levels. Denies having any significant pain, nausea or vomiting since time of ER visit. He denies any fever or chills. Denies seeing or feeling stone pass. Past Medical History:   Diagnosis Date    Gout     Hypertension        Past Surgical History:   Procedure Laterality Date    TONSILLECTOMY      as a child       Current Outpatient Medications on File Prior to Visit   Medication Sig Dispense Refill    amLODIPine-benazepril (LOTREL) 10-20 MG per capsule Take 1 capsule by mouth daily 90 capsule 3    allopurinol (ZYLOPRIM) 300 MG tablet Take 1 tablet by mouth daily 90 tablet 3    indomethacin (INDOCIN) 50 MG capsule Take 1 capsule by mouth every 8 hours as needed (gout) 30 capsule 2     No current facility-administered medications on file prior to visit. No Known Allergies    Social History     Tobacco Use    Smoking status: Never Smoker    Smokeless tobacco: Never Used   Substance Use Topics    Alcohol use: No    Drug use: No       Family History   Problem Relation Age of Onset    High Blood Pressure Father        Review of Systems   Constitutional: Negative for chills, fatigue, fever or weight loss. Eyes: Denies reported visual changes. Cardiovascular: Negative for chest pain, palpitations, tachycardia or edema. Respiratory: Denies cough or SOB. GI: Denies any constipation or diarrhea. Nausea and vomiting a few days ago. : see HPI.   Musculoskeletal: Patient denies low back pain or painful or reduced range of  motion of the back or extremities. Neurological: The patient denies any symptoms of neurological impairment or TIA's; no history of stroke. Lymphatic: Denies swollen glands in neck, axillary or inguinal areas. Psychiatric: Denies anxiety or depression. Skin: Denies rash or lesions. The remainder of the complete ROS is negative. Physical Exam  Nursing note and vitals reviewed. Constitutional: Alert and oriented times 3, no acute distress and cooperative to examination with appropriate mood and affect. HENT:   Head:        Normocephalic and atraumatic. Mouth/Throat:         Mucous membranes are normal.   Eyes:         EOM are normal. No scleral icterus. PERRLA. Pulmonary/Chest:    Normal respiratory rate and rhthym. No use of accessory muscles. Abdominal:         Soft. No tenderness. No rebound or guarding. No CVA tenderness. Musculoskeletal:         Normal range of motion. Extremities: No cyanosis, clubbing, or edema present. Neurological:        Alert and oriented. Skin:       Skin color, texture, turgor normal. No rashes or lesions. Psychiatric:        Normal mood and affect.      Labs   WBC:    Lab Results   Component Value Date    WBC 7.7 08/20/2021     Hemoglobin/Hematocrit:    Lab Results   Component Value Date    HGB 14.4 08/20/2021    HCT 43.1 08/20/2021     BMP:    Lab Results   Component Value Date     08/20/2021    K 3.9 08/20/2021     08/20/2021    CO2 24 08/20/2021    BUN 15 08/20/2021    LABALBU 4.5 08/20/2021    CREATININE 1.0 08/20/2021    CALCIUM 9.2 08/20/2021    LABGLOM 81 08/20/2021     PT/INR:  No results found for: PROTIME, INR  PTT:  No results found for: APTT[APTT  Urinalysis:   Lab Results   Component Value Date    BLOODU Negative 09/15/2021    BLOODU LARGE 08/20/2021    NITRU Negative 09/15/2021    LEUKOCYTESUR NEGATIVE 08/20/2021    COLORU Yellow 09/15/2021    COLORU DARK YELLOW 08/20/2021 WBCUA 0-2 08/20/2021    BACTERIA NONE SEEN 08/20/2021     Urine Culture:  No results found for: Daphne Hawk  Blood Culture:  No results found for: [facility]      Radiology  The patient has had a CT Stone Protocol which I have reviewed along with its accompanying report. The study demonstrates :    Mild left-sided hydronephrosis with 2 proximal left ureteral calculi    measuring 2-3 mm each. Multiple nonobstructing bilateral renal calculi are also noted. Splenomegaly.                   KUB:       TECHNIQUE: 2 AP views of the abdomen were obtained in the supine position.       FINDINGS:        Several densities project over the bilateral renal shadows, likely representing intrarenal kidney stones. The largest on the left measures approximately 7 mm. The largest on the right is approximately 6 mm. No radiodense calculi project over the course    of the ureters.       There are no distended bowel loops. There are no displaced bowel loops.  There is no gross free air. No suspicious osseous lesions are present.           Impression   1. Calculi project over the renal shadows on both sides. Renal US:     COMPARISON: No comparison available.       TECHNIQUE: Multiple sonographic images of both kidneys were obtained. Images of the urinary bladder were also obtained.       FINDINGS:       RIGHT KIDNEY - 12.2 x 5.9 x 6.3 cm    Resistive Index - 0.64    Cortical Thickness - 1.5 cm        LEFT KIDNEY - 12.5 x 6.2 x 6.4 cm    Resistive Index - 0.59    Cortical Thickness - 1.7 cm        URINARY BLADDER    Pre-Void - 127 mL    Post-Void - 12.6 mL            KIDNEYS:  Both kidneys are normal in size and contour.  The resistive indices are normal.        MASS/CYST: No solid or cystic lesion of either kidney is seen.          HYDRONEPHROSIS:  Mild fluid distention of the left renal pelvis but no ekta hydronephrosis.          CALCULI:  Small nonobstructing 8 mm calculus lower pole left kidney.        BLADDER:  The urinary bladder is unremarkable. .                Impression   Nonobstructing 8 mm calculus lower pole left kidney. No acute findings.             KUB:           Impression   1. 4.8 and 2.5 mm stones overlying the mid and lower poles the left kidney, unchanged since previous study dated 8 September 2021.   2. Small stones overlying the right kidney, less prominent than on previous study dated September 2021.                     Assessment    Left flank pain- resolved  Two proximal left ureteral stones- likely passed  Bilateral non obstructive renal stones      Plan    He has 2 small left proximal ureteral stones- in August, no pain since. Likely passed. We discussed ESWL for renal stones. Will continue to monitor for now. Discussed litholink, discussed starting potassium citrate due to low urine PH. He is not interested at this time. Low oxalate diet hand out given to pt.       LESLEE in 6 months      LORRAINE Anders - CNP, CNP  12/22/2021 8:28 AM

## 2022-02-19 NOTE — PROGRESS NOTES
[facility] [address] [facility] initial consultation note    Majo Randhawa                                                Chief complaint: Majo Randhawa is a 40 y. o.oldmale came for further evaluation regarding his ?sleep apnea  with referral from Dr. Gio Yarbrough MD and Dr. Hilary Ritchie DO. [place]:    Sleep/Wake schedule:  Usual time to go to bed during the work/regular day of week: 10:30 PM  Usual time to wake up during the work//regular day of week: 7 AM  Over the weekends his sleep schedule: [x] Remain same. He usually falls a sleep in less than: Less than 5 minutes  He takes naps: Yes. Number of naps per month: Once a month  During each nap he spends a total of: Less than 1 hour  The naps were reported as refreshing: Yes. Sleep Hygiene:  Is the temperature and evironment in his bed room is acceptable to him: Yes. He watches Television in his bed room: Yes. He read books, study, pay bills etc in the bed: Yes. He works with his smart phone before going to sleep. Frequency He wake up during night/sleep: 1-2 times  Majority of nocturnal awakenings are for urination: Yes. Difficulty in falling back to sleep after nocturnal awakenings: No   .  Do you drink coffee: No.       Do you drink caffeinated beverages i.e sodas: Yes. He drinks caffeinated sodas occasionally. He drinks diet Coke  Do you drink tea: No.     Do you drink alcoholic beverages: No.    History of recreational drug use: No    Social History     Tobacco Use    Smoking status: Never Smoker    Smokeless tobacco: Never Used   Substance Use Topics    Alcohol use: No    Drug use: No       Sleep apnea symptoms:  Noticed to have loud snoring:Yes. Noted by his family member- spouse  Witnessed apneas during sleep noticed: Yes. Occasionally.  Noted by his family member- spouse  History of choking and gasping sensation at night time: No.   History of headaches in the morning:No.   History of dry mouth in the morning: Yes. History of palpitations during night time/nocturnal awakenings: No.   History of sweating during night time/nocturnal awakenings: No.      General:  History of head injury in the past: No.   History of seizures: No  Rest less legs syndrome symptoms:NO  History suggestive of periodic limb movements during sleep: NO  History suggestive of hypnagogic hallucinations: NO  History suggestive of hypnopompic hallucinations: NO  History suggestive of sleep talking:NO  History suggestive of sleep walking:NO  History suggestive of bruxism: NO    History suggestive of cataplexy: NO  History suggestive of sleep paralysis: NO    Family history of sleep disorders:  Family history of obstructive sleep apnea: NO.  Family history of Narcolepsy: NO.  Family history of Rest less legs syndrome : NO.      History regarding old sleep studies:  Prior history of sleep study: No.  Using CPAP device: No.  Currently using home Oxygen: NO.       Patient considerations:  Is the patient is ambulatory: Yes  Patient is currently using: None of these Wheelchair, Bell Clamp or [employer]. Para/Quadriplegic: NO  Hearing deficit : NO  Claustrophobic: NO  MDD : NO  Blind: NO  Incontinent: NO  Para/Quadraplegi: NO.   Need transportation to and from Sleep Center:NO      Social History:  Social History     Tobacco Use    Smoking status: Never Smoker    Smokeless tobacco: Never Used   Substance Use Topics    Alcohol use: No    Drug use: No   .  He is currently working: Yes. He works for a [employer] during daytime from home.   He usually goes to his work at: 8 AM  He completes his work at: 4 PM to 5 PM                         Past Medical History:   Diagnosis Date    Gout     Hypertension        Past Surgical History:   Procedure Laterality Date    TONSILLECTOMY      as a child       No Known Allergies    Current Outpatient Medications   Medication Sig Dispense Refill    amLODIPine-benazepril (LOTREL) 10-20 MG per capsule Take 1 capsule by mouth daily 90 capsule 3    allopurinol (ZYLOPRIM) 300 MG tablet Take 1 tablet by mouth daily 90 tablet 3    indomethacin (INDOCIN) 50 MG capsule Take 1 capsule by mouth every 8 hours as needed (gout) 30 capsule 2     No current facility-administered medications for this visit. Family History   Problem Relation Age of Onset    High Blood Pressure Father         Review of Systems:   General/Constitutional: He gained approximately 5 to 10 pounds of weight in the last 1 year with a normal appetite. No fever or chills. HENT: Negative. Eyes: Negative. Upper respiratory tract: No nasal stuffiness or post nasal drip. Lower respiratory tract/ lungs: No cough or sputum production. No hemoptysis. Cardiovascular: No palpitations or chest pain. Gastrointestinal: No nausea or vomiting. Neurological: No focal neurologiacal weakness. Extremities: No edema. Musculoskeletal: No complaints. Genitourinary: No complaints. Hematological: Negative. Psychiatric/Behavioral: Negative. Skin: No itching. /84 (Site: Right Upper Arm, Position: Sitting, Cuff Size: Large Adult)   Pulse 82   Temp 97.5 °F (36.4 °C) (Temporal)   Ht 5' 11\" (1.803 m)   Wt 269 lb (122 kg)   SpO2 98% Comment: r/a  BMI 37.52 kg/m²   BMI:  Body mass index is 37.52 kg/m². Mallampati airway Class:2  Neck Circumference:18. 25Inches  SAQLI:83   [place] sleepiness score 3/18/22: 5. Physical Exam:   Nursing note and vitals reviewed. Constitutional: Patient appears moderately built and moderately nourished. No distress. Patient is oriented to person, place, and time. HENT:   Head: Normocephalic and atraumatic. Right Ear: External ear normal.   Left Ear: External ear normal.   Mouth/Throat: Oropharynx is clear and moist.  No oral thrush. Eyes: Conjunctivae are normal. Pupils are equal, round, and reactive to light. No scleral icterus. Neck: Neck supple. No JVD present.  No tracheal deviation present. Cardiovascular: Normal rate, regular rhythm, normal heart sounds. No murmur heard. Pulmonary/Chest: Effort normal and breath sounds normal. No stridor. No respiratory distress. No wheezes. No rales. Patient exhibits no tenderness. Abdominal: Soft. Patient exhibits no distension. No tenderness. Musculoskeletal: Normal range of motion. Extremities: Patient exhibits no edema and no tenderness. Lymphadenopathy:  No cervical adenopathy. Neurological: Patient is alert and oriented to person, place, and time. Skin: Skin is warm and dry. Patient is not diaphoretic. Psychiatric: Patient  has a normal mood and affect. Patient behavior is normal.     Diagnostic Data:  None related sleep. Assessment:  -Snoring with witnessed apneas-need to to evaluate for obstructive sleep apnea as per his ENT specialist Dr. Hoa Dixon MD.  -Inadequate sleep hygiene.  -Essential hypertension on treatment medications under control  -Gouty arthritis on treatment with medication      Recommendations/Plan:  -Will schedule patient for polysomnogram in the sleep lab. -I had a discussion with patient regarding avialable treatment options for his sleep disorder breathing including but not limited to CPAP titration in the sleep lab Vs.Dental appliance placement with referral to a local dentist Vs other available surgical options including Uvulopalatopharyngoplasty, maxillomandibular ostomy and tracheostomy as last option. At the end of discussion, he is not decided on his   treatment if he found to have obstructive sleep apnea at this time.  -We will see Bindu Hendricks back in 1week after the sleep study to go over the sleep study results and further management options.  -He was educated to practice good sleep hygiene practices. He was provided with a good sleep hygiene hand out.  -Fany Rankin was advised to make earlier appointment with my clinic if he develops any worsening of sleep symptoms.  He verbalizes understanding.  -He was advised to loose weight by controlling diet and doing exercise once cleared by his family physician. - Majo Randhawa was educated about my impression and plan. He verbalizes understanding.      -I personally reviewed updated the Past medical hx, Past surgical hx,Social hx, Family hx, Medications, Allergies in the discrete data section of the patient chart along with labs, Pulmonary medicine,Sleep medicine related, Pathological, Microbiological and Radiological investigations.

## 2022-03-18 ENCOUNTER — INITIAL CONSULT (OUTPATIENT)
Dept: PULMONOLOGY | Age: 47
End: 2022-03-18
Payer: COMMERCIAL

## 2022-03-18 VITALS
HEART RATE: 82 BPM | BODY MASS INDEX: 37.66 KG/M2 | SYSTOLIC BLOOD PRESSURE: 126 MMHG | OXYGEN SATURATION: 98 % | WEIGHT: 269 LBS | HEIGHT: 71 IN | TEMPERATURE: 97.5 F | DIASTOLIC BLOOD PRESSURE: 84 MMHG

## 2022-03-18 DIAGNOSIS — G47.30 SLEEP APNEA, UNSPECIFIED TYPE: ICD-10-CM

## 2022-03-18 DIAGNOSIS — R06.83 SNORING: ICD-10-CM

## 2022-03-18 DIAGNOSIS — R06.81 APNEA: Primary | ICD-10-CM

## 2022-03-18 DIAGNOSIS — I10 ESSENTIAL HYPERTENSION: ICD-10-CM

## 2022-03-18 PROCEDURE — 99204 OFFICE O/P NEW MOD 45 MIN: CPT | Performed by: INTERNAL MEDICINE

## 2022-03-18 NOTE — PROGRESS NOTES
Chief Complaint:Patient is here for sleep consult, no prior studies. Mallampati airway Class:I  Neck Circumference:18. 25Inches    SAQLI:83 [place] sleepiness score 3/18/22: 5.

## 2022-03-18 NOTE — PATIENT INSTRUCTIONS
Recommendations/Plan:  -Will schedule patient for polysomnogram in the sleep lab. -I had a discussion with patient regarding avialable treatment options for his sleep disorder breathing including but not limited to CPAP titration in the sleep lab Vs.Dental appliance placement with referral to a local dentist Vs other available surgical options including Uvulopalatopharyngoplasty, maxillomandibular ostomy and tracheostomy as last option. At the end of discussion, he is not decided on his   treatment if he found to have obstructive sleep apnea at this time.  -We will see Que Somers back in 1week after the sleep study to go over the sleep study results and further management options.  -He was educated to practice good sleep hygiene practices. He was provided with a good sleep hygiene hand out.  -Reyes Annmarie was advised to make earlier appointment with my clinic if he develops any worsening of sleep symptoms. He verbalizes understanding.  -He was advised to loose weight by controlling diet and doing exercise once cleared by his family physician. - Que Somers was educated about my impression and plan. He verbalizes understanding.

## 2022-04-25 ENCOUNTER — HOSPITAL ENCOUNTER (OUTPATIENT)
Dept: SLEEP CENTER | Age: 47
Discharge: HOME | End: 2022-04-27
Payer: COMMERCIAL

## 2022-04-25 DIAGNOSIS — I10 ESSENTIAL HYPERTENSION: ICD-10-CM

## 2022-04-25 DIAGNOSIS — R06.81 APNEA: ICD-10-CM

## 2022-04-25 DIAGNOSIS — R06.83 SNORING: ICD-10-CM

## 2022-04-25 DIAGNOSIS — G47.30 SLEEP APNEA, UNSPECIFIED TYPE: ICD-10-CM

## 2022-04-25 PROCEDURE — 95810 POLYSOM 6/> YRS 4/> PARAM: CPT

## 2022-04-26 LAB — STATUS: NORMAL

## 2022-04-27 NOTE — PROGRESS NOTES
[address] [place] [address]                               SLEEP STUDY REPORT    PATIENT NAME: Algie Leventhal                      :        1975  MED REC NO:   [id number]                           ROOM:  ACCOUNT NO:   [de-identified]                           ADMIT DATE: 2022  PROVIDER:     Marlen Raymundo MD    DATE OF STUDY:  2022    REFERRING PROVIDER:  Alon Huffman. MD Shirley    The patient's height is 71 inches, weight is 269 pounds with a BMI of  37.5. HISTORY:  The patient is a 67-year-old gentleman who was initially  evaluated by me on 2022. The patient had a history of snoring  with witnessed apneas. The patient had associated comorbidities  including essential hypertension and gouty arthritis. The patient is  scheduled for a sleep study to evaluate for sleep apnea as an etiology  for nocturnal awakenings. METHODS:  The patient underwent digital polysomnography in compliance  with the standards and specifications from the AASM Manual including the  simultaneous recording of 3 EEG channels (F4-M1, C4-M1, and O2-M1 with  back up electrodes F3-M2, C3-M2, and O1-M2), 2 EOG channels (E1-M2, and  E2-M1,), EMG (chin, left & right leg), EKG, Nonin pulse oximetry with   less than 2 second averaging time, body position, airflow recorded by  oral-nasal thermal sensor and nasal air pressure transducer, plus  respiratory effort recorded by calibrated respiratory inductance  plethysmography (RIP), flow volume loop, sound and video. Sleep staging  and scoring followed the standard put forth by the American Academy of  Sleep Medicine and utilized the 1B obstructive hypopnea event  desaturation of 4 percent or greater. INTERPRETATION:  This is a baseline sleep study, and the study was  performed on 2022.   The study was started at 09:52 p.m. and was  terminated at 05:04 a.m. with a total recording time of 432.5 minutes,  and sleep period time was 431.8 minutes. Total sleep time was 390.8  minutes, and overall sleep efficiency was 90.4%. The sleep onset  latency was 0.6 minutes, and wake after sleep onset was 41 minutes. REM  sleep latency was 175 minutes. SLEEP STAGING AND DISTRIBUTION SUMMARY:  Revealed the patient spent 20.5  minutes in stage I consisting of 5.2% of total sleep time, 288.5 minutes  in stage II consisting of 73.8%, 31 minutes in stage III consisting of  7.9%, 50.8 minutes in REM sleep consisting of 13% of total sleep time. RESPIRATORY EVENT ANALYSIS:  Revealed the patient had a total of 138  apneas. Out of 138, all of them are obstructive in nature. The patient  also had a total of 217 hypopneas. All of them are obstructive in  nature. The total number of apneas and hypopneas recorded during the  study was 355 with apnea-hypopnea index of 54.5. The patient's REM  sleep apnea-hypopnea index was 82.6. POSITION ANALYSIS:  Revealed the patient spent 126.8 minutes in supine  position, 16.8 minutes in prone position, 49.7 minutes in left lateral  position, 197.6 minutes in right lateral position with a supine  apnea-hypopnea index of 80 whereas prone position apnea-hypopnea index  was 32.2, left lateral position apnea-hypopnea index was 55.5, right  lateral position apnea-hypopnea index was 41.3. PERIODIC LIMB MOVEMENT ANALYSIS:  Revealed the patient did not have any  periodic limb movements. The patient had a total of 53 spontaneous  arousals with a spontaneous arousal index of 8.1. OXYGEN SATURATION MONITORING:  Revealed the patient had a maximum oxygen  desaturation to 72% with a mean oxygen saturation of 89.8%. The patient  spent a total of 84.4 minutes below oxygen saturation less than 88%. EKG MONITORING:  Revealed normal sinus rhythm. IMPRESSION:  1. Severe obstructive sleep apnea with worsening of respiratory events  during REM sleep.   2.  Decreased and delayed REM sleep limiting the interpretation of the  sleep study. 3.  Nocturnal hypoxia. The patient spent a total of 84.4 minutes below  oxygen saturation less than 88%. 4.  Essential hypertension. 5.  Gouty arthritis. RECOMMENDATIONS:  The patient should be scheduled for followup in my  clinic as soon as possible to discuss about the sleep study findings for  further management. Thanks to Ramiro Diez. MD Shirley, and Melinda Martínez DO, for giving me  this opportunity to participate in the care of this pleasant gentleman.         Kalman Dancer, MD    D: 04/26/2022 16:59:39       T: 04/27/2022 13:29:05     [email or web address]  Job#: [id number]     Doc#: [id number]    CC:

## 2022-04-29 ENCOUNTER — OFFICE VISIT (OUTPATIENT)
Dept: PULMONOLOGY | Age: 47
End: 2022-04-29
Payer: COMMERCIAL

## 2022-04-29 VITALS
SYSTOLIC BLOOD PRESSURE: 128 MMHG | WEIGHT: 271.2 LBS | OXYGEN SATURATION: 97 % | HEART RATE: 89 BPM | HEIGHT: 71 IN | TEMPERATURE: 97.3 F | DIASTOLIC BLOOD PRESSURE: 82 MMHG | BODY MASS INDEX: 37.97 KG/M2

## 2022-04-29 DIAGNOSIS — R06.83 SNORING: ICD-10-CM

## 2022-04-29 DIAGNOSIS — G47.33 OSA (OBSTRUCTIVE SLEEP APNEA): Primary | ICD-10-CM

## 2022-04-29 DIAGNOSIS — I10 ESSENTIAL HYPERTENSION: ICD-10-CM

## 2022-04-29 DIAGNOSIS — E66.9 OBESITY (BMI 30-39.9): ICD-10-CM

## 2022-04-29 PROCEDURE — 99214 OFFICE O/P EST MOD 30 MIN: CPT | Performed by: NURSE PRACTITIONER

## 2022-04-29 ASSESSMENT — ENCOUNTER SYMPTOMS
WHEEZING: 0
NAUSEA: 0
ABDOMINAL PAIN: 0
APNEA: 0
EYES NEGATIVE: 1
DIARRHEA: 0
SHORTNESS OF BREATH: 0
COUGH: 0
VOMITING: 0

## 2022-04-29 NOTE — PROGRESS NOTES
[facility]    Tiffany Mendoza, 40 y.o.  [id number]      Pt of [facility]   Nurses Notes   Jared Rodrigues is here for a PSG follow up  Study Results  Initial Study Date -  4/25/22  AHI -  54.5    TotalEvents - 355 (Apneas  138  Hypopneas 217  Central  0)  LM w/Arousals - 0  Sleep Efficiency - 90.4 % (Total Sleep Time - 390.8 min)  Time with Sats below 88% - 84.4 min  Weight 269 lb  Ess 5  saqli 89  Interval History       Tfifany Mendoza is a 40 y.o. old male who comes in to review the results of his recent sleep study, to answer questions and to explore options for treatment. he continues to have symptoms of snoring. Snoring has got worse in past 6 months, weight has been going up. Gained about 20 lbs in 3 years   Seen By ENT and referred here for sleep study      Allergies  No Known Allergies  Meds  Current Outpatient Medications   Medication Sig Dispense Refill    amLODIPine-benazepril (LOTREL) 10-20 MG per capsule Take 1 capsule by mouth daily 90 capsule 3    allopurinol (ZYLOPRIM) 300 MG tablet Take 1 tablet by mouth daily 90 tablet 3    indomethacin (INDOCIN) 50 MG capsule Take 1 capsule by mouth every 8 hours as needed (gout) 30 capsule 2     No current facility-administered medications for this visit. ROS  Review of Systems   Constitutional: Positive for unexpected weight change. Negative for activity change, appetite change, chills, fatigue and fever. HENT: Negative. Eyes: Negative. Respiratory: Negative for apnea, cough, shortness of breath and wheezing. Snoring    Cardiovascular: Negative for chest pain, palpitations and leg swelling. Gastrointestinal: Negative for abdominal pain, diarrhea, nausea and vomiting. Genitourinary: Negative. Musculoskeletal: Negative. Skin: Negative. Neurological: Negative. Hematological: Negative. Psychiatric/Behavioral: Negative.            Exam  Vitals -  /82 (Site: Left Upper Arm, Position: Sitting) Pulse 89   Temp 97.3 °F (36.3 °C)   Ht 5' 11\" (1.803 m)   Wt 271 lb 3.2 oz (123 kg)   SpO2 97% Comment: onRA  BMI 37.82 kg/m²    Body mass index is 37.82 kg/m². Oxygen level - Room Air  Physical Exam  Vitals and nursing note reviewed. Constitutional:       Appearance: Normal appearance. He is obese. HENT:      Head: Normocephalic and atraumatic. Mouth/Throat:      Pharynx: Oropharynx is clear. Eyes:      Conjunctiva/sclera: Conjunctivae normal.   Pulmonary:      Effort: Pulmonary effort is normal. No tachypnea, bradypnea or respiratory distress. Skin:     Findings: No erythema or rash. Neurological:      Mental Status: He is alert and oriented to person, place, and time. Psychiatric:         Attention and Perception: Attention normal.         Mood and Affect: Mood normal.         Speech: Speech normal.         Behavior: Behavior normal.         Thought Content: Thought content normal.         Cognition and Memory: Cognition normal.         Judgment: Judgment normal.        Assessment   Diagnosis Orders   1. DOROTHY (obstructive sleep apnea)  DME Order for CPAP as OP   2. Snoring  DME Order for CPAP as OP   3. Essential hypertension  DME Order for CPAP as OP   4. Obesity (BMI 30-39. 9)  DME Order for CPAP as OP      Recommendations  I reviewed the sleep study results in detail with patient. We discussed various treatment options including positional therapy, wt loss, OAT and PAP therapies along with the benefits and limitations of each. Discussed obesity and healthy BMI   We also discussed the health risks with untreated DOROTHY. Due to the severity of apnea, oxygen desaturation and symptoms, PAP therapies is recommended. Patient agrees to proceed with APAP set up (pending insurance) with mask fitting. Educated on proper sleep hygiene measures.     30 minutes was spent on this visit with > 50% being face to face obtaining HPI, examining patient, reviewing test results, educating and coordinating a treatment plan.       Electronically signed by LORRAINE Thompson CNP on 4/29/2022 at 9:15 AM

## 2022-05-06 ENCOUNTER — HOSPITAL ENCOUNTER (OUTPATIENT)
Age: 47
Discharge: HOME | End: 2022-05-06
Payer: COMMERCIAL

## 2022-05-06 ENCOUNTER — HOSPITAL ENCOUNTER (OUTPATIENT)
Dept: CT IMAGING | Age: 47
Discharge: HOME | End: 2022-05-06
Payer: COMMERCIAL

## 2022-05-06 ENCOUNTER — TELEPHONE (OUTPATIENT)
Dept: UROLOGY | Age: 47
End: 2022-05-06

## 2022-05-06 ENCOUNTER — OFFICE VISIT (OUTPATIENT)
Dept: UROLOGY | Age: 47
End: 2022-05-06
Payer: COMMERCIAL

## 2022-05-06 VITALS
HEIGHT: 71 IN | BODY MASS INDEX: 37.1 KG/M2 | DIASTOLIC BLOOD PRESSURE: 74 MMHG | TEMPERATURE: 97.9 F | WEIGHT: 265 LBS | SYSTOLIC BLOOD PRESSURE: 132 MMHG

## 2022-05-06 DIAGNOSIS — N20.0 KIDNEY STONE: ICD-10-CM

## 2022-05-06 DIAGNOSIS — N20.0 KIDNEY STONE: Primary | ICD-10-CM

## 2022-05-06 DIAGNOSIS — R52 ACUTE PAIN: ICD-10-CM

## 2022-05-06 LAB
ANION GAP SERPL CALCULATED.3IONS-SCNC: 11 MEQ/L (ref 8–16)
BASOPHILS # BLD: 0.4 %
BASOPHILS ABSOLUTE: 0 THOU/MM3 (ref 0–0.1)
BUN BLDV-MCNC: 23 MG/DL (ref 7–22)
CALCIUM SERPL-MCNC: 8.9 MG/DL (ref 8.5–10.5)
CHLORIDE BLD-SCNC: 99 MEQ/L (ref 98–111)
CO2: 28 MEQ/L (ref 23–33)
CREAT SERPL-MCNC: 1.8 MG/DL (ref 0.4–1.2)
EOSINOPHIL # BLD: 0.4 %
EOSINOPHILS ABSOLUTE: 0 THOU/MM3 (ref 0–0.4)
ERYTHROCYTE [DISTWIDTH] IN BLOOD BY AUTOMATED COUNT: 13.2 % (ref 11.5–14.5)
ERYTHROCYTE [DISTWIDTH] IN BLOOD BY AUTOMATED COUNT: 40.7 FL (ref 35–45)
GFR SERPL CREATININE-BSD FRML MDRD: 41 ML/MIN/1.73M2
GLUCOSE BLD-MCNC: 102 MG/DL (ref 70–108)
HCT VFR BLD CALC: 42.3 % (ref 42–52)
HEMOGLOBIN: 14.3 GM/DL (ref 14–18)
IMMATURE GRANS (ABS): 0.04 THOU/MM3 (ref 0–0.07)
IMMATURE GRANULOCYTES: 0.4 %
LYMPHOCYTES # BLD: 9 %
LYMPHOCYTES ABSOLUTE: 1 THOU/MM3 (ref 1–4.8)
MCH RBC QN AUTO: 29.1 PG (ref 26–33)
MCHC RBC AUTO-ENTMCNC: 33.8 GM/DL (ref 32.2–35.5)
MCV RBC AUTO: 86 FL (ref 80–94)
MONOCYTES # BLD: 8.8 %
MONOCYTES ABSOLUTE: 1 THOU/MM3 (ref 0.4–1.3)
NUCLEATED RED BLOOD CELLS: 0 /100 WBC
PLATELET # BLD: 274 THOU/MM3 (ref 130–400)
PMV BLD AUTO: 10 FL (ref 9.4–12.4)
POTASSIUM SERPL-SCNC: 4.4 MEQ/L (ref 3.5–5.2)
RBC # BLD: 4.92 MILL/MM3 (ref 4.7–6.1)
SEG NEUTROPHILS: 81 %
SEGMENTED NEUTROPHILS ABSOLUTE COUNT: 9.1 THOU/MM3 (ref 1.8–7.7)
SODIUM BLD-SCNC: 138 MEQ/L (ref 135–145)
WBC # BLD: 11.2 THOU/MM3 (ref 4.8–10.8)

## 2022-05-06 PROCEDURE — 81001 URINALYSIS AUTO W/SCOPE: CPT

## 2022-05-06 PROCEDURE — 74176 CT ABD & PELVIS W/O CONTRAST: CPT

## 2022-05-06 PROCEDURE — 87086 URINE CULTURE/COLONY COUNT: CPT

## 2022-05-06 PROCEDURE — 85025 COMPLETE CBC W/AUTO DIFF WBC: CPT

## 2022-05-06 PROCEDURE — 99214 OFFICE O/P EST MOD 30 MIN: CPT | Performed by: NURSE PRACTITIONER

## 2022-05-06 PROCEDURE — 36415 COLL VENOUS BLD VENIPUNCTURE: CPT

## 2022-05-06 PROCEDURE — 80048 BASIC METABOLIC PNL TOTAL CA: CPT

## 2022-05-06 RX ORDER — ONDANSETRON 4 MG/1
4 TABLET, ORALLY DISINTEGRATING ORAL 3 TIMES DAILY PRN
Qty: 21 TABLET | Refills: 0 | Status: SHIPPED | OUTPATIENT
Start: 2022-05-06

## 2022-05-06 RX ORDER — TAMSULOSIN HYDROCHLORIDE 0.4 MG/1
0.4 CAPSULE ORAL DAILY
Qty: 30 CAPSULE | Refills: 0 | Status: SHIPPED | OUTPATIENT
Start: 2022-05-06 | End: 2022-06-01

## 2022-05-06 RX ORDER — KETOROLAC TROMETHAMINE 10 MG/1
10 TABLET, FILM COATED ORAL EVERY 6 HOURS PRN
Qty: 20 TABLET | Refills: 0 | Status: SHIPPED | OUTPATIENT
Start: 2022-05-06

## 2022-05-06 RX ORDER — HYDROCODONE BITARTRATE AND ACETAMINOPHEN 5; 325 MG/1; MG/1
1 TABLET ORAL EVERY 6 HOURS PRN
Qty: 12 TABLET | Refills: 0 | Status: SHIPPED | OUTPATIENT
Start: 2022-05-06 | End: 2022-05-09

## 2022-05-06 ASSESSMENT — ENCOUNTER SYMPTOMS
VOMITING: 1
NAUSEA: 1
ABDOMINAL PAIN: 1
BACK PAIN: 0

## 2022-05-06 NOTE — TELEPHONE ENCOUNTER
Patient stated the pain is on the left side. Can he get a refill for the toradol? Should flomax be ordered? His Ct scan scheduled 05/10/2022 and follow up on 05/13/2022. If pain gets to severe or other symptoms worsen he will go to the ER.

## 2022-05-06 NOTE — TELEPHONE ENCOUNTER
Please schedule pt for CT abd/pelvis without contrast and appt to review results. Check CBC, BMP, urinalysis, urine culture. If pain or symptoms are severe or worsen prior to appt he will need evaluated in the ER.

## 2022-05-06 NOTE — TELEPHONE ENCOUNTER
Patient scheduled for CT ABDOMEN PELVIS WO  at [address] on 5/10/22 ARRIVE AT 9AM FOR A 930AM SCAN.     Order mailed with instructions or given to the patient in the office

## 2022-05-06 NOTE — TELEPHONE ENCOUNTER
Patient has had symptoms for the last couple days. The pain has increased and becoming more constant rated 6 on scale of 0-10 that is dull. He thinks the stone is moving. C/o nausea and vomiting the last couple of days and mostly dry heaving now. He denies fever or chills. He has 2 norco and 7-8 of toradol left at home. His symptoms are tolerable but will present to the ER if the pain becomes to severe, intractable nausea and vomiting, or develops fever or chills. He has a follow up with you in June. Do you want the appointment moved up with imaging prior? He is aware narcotics can not be ordered unless he is evaluated and not to take ibuprofen with toradol. Please advise.  Thank you

## 2022-05-06 NOTE — PATIENT INSTRUCTIONS
Kidney Stone Prevention    Kidney/Ureteral stones are formed by the normal chemicals which are present in the urine. If the urine gets super concentrated by any of the common ingredients e.g. Calcium, uric acid, oxalate, or phosphate, the stone is formed. The three most important changes, which you should make to your diet to prevent recurrence are as follows:      1. Drink at least 3 quarts of water a day, and even more in hot weather. You need to drink enough water to make at least two quarts of urine per day. 2. Limit your salt intake to no more than 3 grams per day. Remember that most of the processed foods are very high in sodium (canned, boxed, frozen, restaurant foods). When you eat large amounts of salty foods/snacks the kidney will eliminate that excess salt along with calcium, which is the most common composition of kidney stones. You do not have to limit your calcium intake (up to 1000 mg). Natural foods (low fat dairy products) as the source of calcium are preferred over pills and may in fact prevent stones. 3. Limit your animal protein (meat, fish, poultry). Try to limit meat consumption to no more than 8 ounces a day. Kidney stones are more common in people consuming large amounts of animal proteins, especially red meat. Increase vegetables/fruit consumption. Other strategies for stone prevention:  · Citric acid in urine prevents stone crystallization. Increase citrate consumption by drinking orange juice, lemonade, or diluted lemon juice. One good and inexpensive way to achieve this goal is by mixing 4 ounces of lemon juice in 2 liters of water to be used in a 24-hour period. · Limit oxalate consumption. Oxalate is one of the most common contents of kidney stones. Foods rich in oxalates are all types of nuts, tea, spinach, rhubarb, cranberry, chocolate, and black pepper.       Risk factors for stone formation:  · If your medical history includes gastric bypass surgery or resection of a large segment of bowel due to inflammatory bowel disease---you may have excessive oxalate absorption from the gut increasing your stone risk. Depending on the stone composition and or 24 hour urine tests, specific recommendations can be made to help prevent kidney stone formation in the future. · Obesity--Kidney stones are more common with obesity. Any weight reduction can be helpful. Do your best!!    Further testing:  · A 24 hour urine collection test called a Providajob Devonte may be ordered by your physician at your follow-up appointment in the office. This test analyzes the elements present in your urine that may increase your risk for kidney stone formation. It allows your medical provider to pinpoint what specific dietary changes or medicine changes can be made to prevent future stones. · Stone analysis can be done on stone fragments retrieved during your procedure or from fragments that you pass in your urine to aid in prevention of future stones.

## 2022-05-06 NOTE — PROGRESS NOTES
[address] [facility] [address]  Dept: [phone]  Loc: [phone]    Visit Date: 5/6/2022        HPI:     Nataliya Arias is a 40 y.o. male who presents today for:  Chief Complaint   Patient presents with    Nephrolithiasis     had ct scan       HPI   Pt seen for evaluation of flank pain and review of ct scan. Will Cotter has a hx of kidney stones with stone passed spontaneously last year. He called into the office today reporting left flank pain 6/10 constant, dull, and aching in character. + for n/v, dry heaves. CT abdomen and pelvis performed prior to appt. Will Cotter reports he first noted left flank pain about 3 weeks ago but it was short lived and resolved until about 2-3 days ago. Reports he has been taking some norco and toradol he had at home from last stone episode. Currently pain improved after taking toradol. Current Outpatient Medications   Medication Sig Dispense Refill    ketorolac (TORADOL) 10 MG tablet Take 1 tablet by mouth every 6 hours as needed for Pain 20 tablet 0    tamsulosin (FLOMAX) 0.4 MG capsule Take 1 capsule by mouth daily 30 capsule 0    amLODIPine-benazepril (LOTREL) 10-20 MG per capsule Take 1 capsule by mouth daily 90 capsule 3    allopurinol (ZYLOPRIM) 300 MG tablet Take 1 tablet by mouth daily 90 tablet 3     No current facility-administered medications for this visit. Past Medical History  Will Cotter  has a past medical history of Gout and Hypertension. Past Surgical History  The patient  has a past surgical history that includes Tonsillectomy. Family History  This patient's family history includes High Blood Pressure in his father. Social History  Will Cotter  reports that he has never smoked. He has never used smokeless tobacco. He reports that he does not drink alcohol and does not use drugs.       Subjective:      Review of Systems   Constitutional: Negative for activity change, appetite change, chills, diaphoresis, fatigue, fever and unexpected weight change. Gastrointestinal: Positive for abdominal pain, nausea and vomiting. Genitourinary: Positive for flank pain. Negative for decreased urine volume, difficulty urinating, dysuria, frequency, hematuria and urgency. Musculoskeletal: Negative for back pain. Objective:   /74   Temp 97.9 °F (36.6 °C) (Temporal)   Ht 5' 11\" (1.803 m)   Wt 265 lb (120.2 kg)   BMI 36.96 kg/m²     Physical Exam  Vitals reviewed. Constitutional:       General: He is not in acute distress. Appearance: Normal appearance. He is well-developed. He is not ill-appearing or diaphoretic. HENT:      Head: Normocephalic and atraumatic. Right Ear: External ear normal.      Left Ear: External ear normal.      Nose: Nose normal.      Mouth/Throat:      Mouth: Mucous membranes are moist.   Eyes:      General: No scleral icterus. Right eye: No discharge. Left eye: No discharge. Neck:      Vascular: No JVD. Trachea: No tracheal deviation. Cardiovascular:      Rate and Rhythm: Normal rate and regular rhythm. Pulmonary:      Effort: Pulmonary effort is normal. No respiratory distress. Breath sounds: Normal breath sounds. Abdominal:      General: There is no distension. Tenderness: There is no abdominal tenderness. There is no right CVA tenderness or left CVA tenderness. Musculoskeletal:         General: Normal range of motion. Skin:     General: Skin is warm and dry. Neurological:      Mental Status: He is alert and oriented to person, place, and time. Mental status is at baseline. Psychiatric:         Mood and Affect: Mood normal.         Behavior: Behavior normal.         Thought Content: Thought content normal.         POC  No results found for this visit on 05/06/22.       Patients recent PSA values are as follows  No results found for: PSA, PSADIA     Recent BUN/Creatinine:  Lab Results Component Value Date    BUN 15 08/20/2021    CREATININE 1.0 08/20/2021       Radiology  The patient has had a CT abd/pelvis 5/6/22 which I have independently reviewed along with its accompanying report. The study demonstrates 4 mm left distal ureter/bladder calculus with mild left hydroureteronephrosis and bilateral nonobstructive nephrolithiasis. Assessment:   4 mm L distal UVJ/bladder calculus with mild hydroureteronephrosis  Left flank pain  n/v  Bilateral nonobstructive nephrolithiasis  Plan:     CT abd/pelvis with UVJ/bladder calculus and left hydroureteronephrosis. No fever/chills. Pt would like to trial spontaneous passage. Continue flomax, toradol. Start zofran prn and norco prn for pain uncontrolled by the toradol. Strain urine. Increase oral fluids. Renal us and KUB in 3-4 weeks with appt to review results. Pt aware to call the office with any worsening in symptoms prior to follow up appt. Pt aware to present to ER for any fever, chills, intractable n/v, intractable pain. CBC, BMP, Urine culture pending.

## 2022-05-07 LAB
AMORPHOUS: ABNORMAL
BACTERIA: ABNORMAL
BILIRUBIN URINE: NEGATIVE
BLOOD, URINE: ABNORMAL
CASTS: ABNORMAL /LPF
CASTS: ABNORMAL /LPF
CHARACTER, URINE: ABNORMAL
COLOR: ABNORMAL
CRYSTALS: ABNORMAL
EPITHELIAL CELLS, UA: ABNORMAL /HPF
GLUCOSE, URINE: NEGATIVE MG/DL
KETONES, URINE: NEGATIVE
LEUKOCYTE EST, POC: ABNORMAL
MISCELLANEOUS LAB TEST RESULT: ABNORMAL
NITRITE, URINE: NEGATIVE
PH UA: 6 (ref 5–9)
PROTEIN UA: NEGATIVE MG/DL
RBC URINE: ABNORMAL /HPF
RENAL EPITHELIAL, UA: ABNORMAL
SPECIFIC GRAVITY UA: 1.02 (ref 1–1.03)
UROBILINOGEN, URINE: 1 EU/DL (ref 0–1)
WBC UA: ABNORMAL /HPF
YEAST: ABNORMAL

## 2022-05-08 LAB — URINE CULTURE, ROUTINE: NORMAL

## 2022-05-09 ENCOUNTER — TELEPHONE (OUTPATIENT)
Dept: UROLOGY | Age: 47
End: 2022-05-09

## 2022-05-09 DIAGNOSIS — R79.89 ELEVATED SERUM CREATININE: Primary | ICD-10-CM

## 2022-05-09 NOTE — TELEPHONE ENCOUNTER
Patient advised of the urine results. He is feeling great. He thinks he passed the stone on Friday and feels normal now. He never did see the stone. He will push fluids and have the BMP drawn today.

## 2022-05-09 NOTE — TELEPHONE ENCOUNTER
Patient scheduled for US RENAL COMPLETE  at [facility] on 5/24/22 ARRIVAL  FOR A 8 AM SCAN TIME WITH NO CARBONATED BEVERAGES THE DAY AND ARRIVE WELL HYDRATED    Order mailed with instructions or given to the patient in the office

## 2022-05-09 NOTE — TELEPHONE ENCOUNTER
Pt's urine culture negative for infection. Creatinine elevated acutely on labwork. Please have him repeat BMP today to assure it is improving and remind him to push fluids. How is he feeling?

## 2022-05-22 DIAGNOSIS — I10 ESSENTIAL HYPERTENSION: ICD-10-CM

## 2022-05-23 RX ORDER — AMLODIPINE BESYLATE AND BENAZEPRIL HYDROCHLORIDE 10; 20 MG/1; MG/1
CAPSULE ORAL
Qty: 90 CAPSULE | Refills: 3 | Status: SHIPPED | OUTPATIENT
Start: 2022-05-23

## 2022-06-01 RX ORDER — TAMSULOSIN HYDROCHLORIDE 0.4 MG/1
CAPSULE ORAL
Qty: 30 CAPSULE | Refills: 0 | Status: SHIPPED | OUTPATIENT
Start: 2022-06-01

## 2022-07-29 ENCOUNTER — HOSPITAL ENCOUNTER (OUTPATIENT)
Dept: ULTRASOUND IMAGING | Age: 47
Discharge: HOME | End: 2022-07-29
Payer: COMMERCIAL

## 2022-07-29 ENCOUNTER — HOSPITAL ENCOUNTER (OUTPATIENT)
Age: 47
Discharge: HOME | End: 2022-07-29
Payer: COMMERCIAL

## 2022-07-29 ENCOUNTER — HOSPITAL ENCOUNTER (OUTPATIENT)
Dept: GENERAL RADIOLOGY | Age: 47
Discharge: HOME | End: 2022-07-29
Payer: COMMERCIAL

## 2022-07-29 DIAGNOSIS — N20.0 KIDNEY STONE: ICD-10-CM

## 2022-07-29 PROCEDURE — 76775 US EXAM ABDO BACK WALL LIM: CPT

## 2022-07-29 PROCEDURE — 74018 RADEX ABDOMEN 1 VIEW: CPT

## 2022-09-01 ENCOUNTER — OFFICE VISIT (OUTPATIENT)
Dept: PULMONOLOGY | Age: 47
End: 2022-09-01
Payer: COMMERCIAL

## 2022-09-01 VITALS
BODY MASS INDEX: 34.58 KG/M2 | OXYGEN SATURATION: 94 % | SYSTOLIC BLOOD PRESSURE: 138 MMHG | HEIGHT: 71 IN | HEART RATE: 68 BPM | WEIGHT: 247 LBS | TEMPERATURE: 97.5 F | DIASTOLIC BLOOD PRESSURE: 84 MMHG

## 2022-09-01 DIAGNOSIS — G47.33 OSA ON CPAP: Primary | ICD-10-CM

## 2022-09-01 DIAGNOSIS — Z99.89 OSA ON CPAP: Primary | ICD-10-CM

## 2022-09-01 DIAGNOSIS — E66.9 OBESITY (BMI 30-39.9): ICD-10-CM

## 2022-09-01 DIAGNOSIS — I10 ESSENTIAL HYPERTENSION: ICD-10-CM

## 2022-09-01 PROCEDURE — 99213 OFFICE O/P EST LOW 20 MIN: CPT | Performed by: NURSE PRACTITIONER

## 2022-09-01 ASSESSMENT — ENCOUNTER SYMPTOMS
ABDOMINAL PAIN: 0
SHORTNESS OF BREATH: 0
NAUSEA: 0
COUGH: 0
EYES NEGATIVE: 1
VOMITING: 0
WHEEZING: 0
DIARRHEA: 0

## 2022-09-01 NOTE — PROGRESS NOTES
[facility]      Cristi Nice         [id number]  9/1/2022   Chief Complaint   Patient presents with    Follow-up     4mo. DOROTHY with Download        Pt of Dr. Mara JAY Download:   Original or initial AHI: 54.5     Date of initial study: 4/25/22      Compliant  93%     Noncompliant 3 %     PAP Type AutoSet Level  6/20   Avg Hrs/Day 7  AHI: 0.6   Recorded compliance dates : 8/1/22-8/30/22  Machine/Mfg:   [x] ResMed    [] Respironics/Dreamstation   Interface:   [] Nasal    [x] Nasal pillows   [] FFM      Provider:      [] -PATRICA     []Butch     [] Konstantin    [x] Dillan Jain    [] Osorio               [] [employer] [facility]      [] Adaptive    [] [address]      [] Other    Neck Size: 18.25  Mallampati 2  ESS:  5  SAQLI: 91    Here is a scan of the most recent download:                Presentation:   Iirs Fisher presents for sleep medicine follow up for obstructive sleep apnea  Since the last visit, Iris Fisher newly set up on APAP. Tolerating well, no longer snoring. Sleeping well. No complaints     Equipment issues: The pressure is  acceptable, the mask is acceptable     Sleep issues:  Do you feel better? Yes  More rested? Yes   Better concentration? yes    Progress History:   Since last visit any new medical issues? No  New ER or hospital visits? No  Any new or changes in medicines? No  Any new sleep medicines? No    Review of Systems -   Review of Systems   Constitutional:  Negative for activity change, appetite change, chills, fatigue, fever and unexpected weight change. HENT: Negative. Eyes: Negative. Respiratory:  Negative for cough, shortness of breath and wheezing. Cardiovascular:  Negative for chest pain, palpitations and leg swelling. Gastrointestinal:  Negative for abdominal pain, diarrhea, nausea and vomiting. Genitourinary: Negative. Musculoskeletal: Negative. Skin: Negative. Neurological: Negative. Hematological: Negative.     Psychiatric/Behavioral: Negative. Negative for sleep disturbance. Physical Exam:    BMI:  Body mass index is 34.45 kg/m². Wt Readings from Last 3 Encounters:   09/01/22 247 lb (112 kg)   05/06/22 265 lb (120.2 kg)   04/29/22 271 lb 3.2 oz (123 kg)     Weight lost 30 lbs over 5 months   Vitals: /84 (Site: Right Upper Arm, Position: Sitting, Cuff Size: Medium Adult)   Pulse 68   Temp 97.5 °F (36.4 °C) (Skin)   Ht 5' 11\" (1.803 m)   Wt 247 lb (112 kg)   SpO2 94%   BMI 34.45 kg/m²       Physical Exam  Vitals and nursing note reviewed. Constitutional:       Appearance: Normal appearance. He is overweight. HENT:      Head: Normocephalic and atraumatic. Mouth/Throat:      Pharynx: Oropharynx is clear. Eyes:      Conjunctiva/sclera: Conjunctivae normal.   Pulmonary:      Effort: Pulmonary effort is normal. No tachypnea, bradypnea or respiratory distress. Skin:     Findings: No erythema or rash. Neurological:      Mental Status: He is alert and oriented to person, place, and time. Psychiatric:         Attention and Perception: Attention normal.         Mood and Affect: Mood normal.         Speech: Speech normal.         Behavior: Behavior normal.         Thought Content: Thought content normal.         Cognition and Memory: Cognition normal.         Judgment: Judgment normal.         ASSESSMENT/DIAGNOSIS     Diagnosis Orders   1. DOROTHY on CPAP        2. Essential hypertension        3. Obesity (BMI 30-39. 9)                 Plan   Do you need any equipment today? No  - Download reviewed and discussed with patient  - He  was advised to continue current positive airway pressure therapy with above described pressure.    - He  advised to keep good compliance with current recommended pressure to get optimal results and clinical improvement  - Recommend 7-9 hours of sleep with PAP  - He was advised to call [employer] regarding supplies if needed.   -He call my office for earlier appointment if needed for worsening of sleep symptoms.   - He was instructed on weight loss  - Kathie Genao was educated about my impression and plan. Patient verbalizesunderstanding.   We will see Yaniv Marin back in: 1 year with download    Information added by my medical assistant/LPN was reviewed today  Electronically signed by LORRAINE Heller - CNP on 9/1/2022 at 9:44 AM

## 2022-09-07 ENCOUNTER — OFFICE VISIT (OUTPATIENT)
Dept: UROLOGY | Age: 47
End: 2022-09-07
Payer: COMMERCIAL

## 2022-09-07 VITALS
WEIGHT: 274 LBS | BODY MASS INDEX: 38.36 KG/M2 | HEART RATE: 65 BPM | SYSTOLIC BLOOD PRESSURE: 139 MMHG | DIASTOLIC BLOOD PRESSURE: 81 MMHG | HEIGHT: 71 IN

## 2022-09-07 DIAGNOSIS — N20.0 KIDNEY STONE: Primary | ICD-10-CM

## 2022-09-07 LAB
BILIRUBIN URINE: NEGATIVE
BLOOD URINE, POC: NEGATIVE
CHARACTER, URINE: CLEAR
COLOR, URINE: YELLOW
GLUCOSE URINE: NEGATIVE MG/DL
KETONES, URINE: NEGATIVE
LEUKOCYTE CLUMPS, URINE: NEGATIVE
NITRITE, URINE: NEGATIVE
PH, URINE: 5.5 (ref 5–9)
POST VOID RESIDUAL (PVR): 70 ML
PROTEIN, URINE: NEGATIVE MG/DL
SPECIFIC GRAVITY, URINE: >= 1.03 (ref 1–1.03)
UROBILINOGEN, URINE: 0.2 EU/DL (ref 0–1)

## 2022-09-07 PROCEDURE — 81003 URINALYSIS AUTO W/O SCOPE: CPT | Performed by: NURSE PRACTITIONER

## 2022-09-07 PROCEDURE — 99213 OFFICE O/P EST LOW 20 MIN: CPT | Performed by: NURSE PRACTITIONER

## 2022-09-07 PROCEDURE — 51798 US URINE CAPACITY MEASURE: CPT | Performed by: NURSE PRACTITIONER

## 2022-09-07 NOTE — PROGRESS NOTES
[address] [address] [place] [address]  Dept: [phone]  Loc: [phone]    Visit Date: 9/7/2022        HPI:     Yaya Merino is a 40 y.o. male who presents today for:  Chief Complaint   Patient presents with    Nephrolithiasis       HPI  Pt seen in follow up for kidney stones. Neel Conti has a hx of kidney stones with stones passed spontaneously in 2021 and 2022. KUB performed prior to today's appt. Mr. Celestina Ferreira denies any significant pain, hematuria, dysuria, or symptoms of stones currently. Current Outpatient Medications   Medication Sig Dispense Refill    tamsulosin (FLOMAX) 0.4 mg capsule take 1 capsule by mouth once daily 30 capsule 0    amLODIPine-benazepril (LOTREL) 10-20 MG per capsule TAKE 1 CAPSULE DAILY 90 capsule 3    ketorolac (TORADOL) 10 MG tablet Take 1 tablet by mouth every 6 hours as needed for Pain 20 tablet 0    ondansetron (ZOFRAN-ODT) 4 MG disintegrating tablet Take 1 tablet by mouth 3 times daily as needed for Nausea or Vomiting 21 tablet 0    allopurinol (ZYLOPRIM) 300 MG tablet Take 1 tablet by mouth daily 90 tablet 3     No current facility-administered medications for this visit. Past Medical History  Neel Conti  has a past medical history of Gout and Hypertension. Past Surgical History  The patient  has a past surgical history that includes Tonsillectomy. Family History  This patient's family history includes High Blood Pressure in his father. Social History  Neel Conti  reports that he has never smoked. He has never used smokeless tobacco. He reports that he does not drink alcohol and does not use drugs. Subjective:      Review of Systems   Constitutional:  Negative for activity change, appetite change, chills, diaphoresis, fatigue, fever and unexpected weight change. Gastrointestinal:  Negative for abdominal pain, nausea and vomiting.    Genitourinary:  Negative for decreased urine volume, difficulty urinating, dysuria, flank pain, frequency, hematuria and urgency. Musculoskeletal:  Negative for back pain. Objective:   /81   Pulse 65   Ht 5' 11\" (1.803 m)   Wt 274 lb (124.3 kg)   BMI 38.22 kg/m²     Physical Exam  Vitals reviewed. Constitutional:       General: He is not in acute distress. Appearance: Normal appearance. He is well-developed. He is not ill-appearing or diaphoretic. HENT:      Head: Normocephalic and atraumatic. Right Ear: External ear normal.      Left Ear: External ear normal.      Nose: Nose normal.      Mouth/Throat:      Mouth: Mucous membranes are moist.   Eyes:      General: No scleral icterus. Right eye: No discharge. Left eye: No discharge. Neck:      Vascular: No JVD. Trachea: No tracheal deviation. Pulmonary:      Effort: Pulmonary effort is normal. No respiratory distress. Abdominal:      General: There is no distension. Tenderness: There is no abdominal tenderness. There is no right CVA tenderness or left CVA tenderness. Musculoskeletal:         General: Normal range of motion. Skin:     General: Skin is warm and dry. Neurological:      Mental Status: He is alert and oriented to person, place, and time. Mental status is at baseline. Psychiatric:         Mood and Affect: Mood normal.         Behavior: Behavior normal.         Thought Content:  Thought content normal.       POC  Results for POC orders placed in visit on 09/07/22   POCT Urinalysis No Micro (Auto)   Result Value Ref Range    Glucose, Ur Negative NEGATIVE mg/dl    Bilirubin Urine Negative     Ketones, Urine Negative NEGATIVE    Specific Gravity, Urine >= 1.030 1.002 - 1.030    Blood, UA POC Negative NEGATIVE    pH, Urine 5.50 5.0 - 9.0    Protein, Urine Negative NEGATIVE mg/dl    Urobilinogen, Urine 0.20 0.0 - 1.0 eu/dl    Nitrite, Urine Negative NEGATIVE    Leukocyte Clumps, Urine Negative NEGATIVE    Color, Urine Yellow YELLOW-STRAW Character, Urine Clear CLR-SL.CLOUD   poct post void residual   Result Value Ref Range    post void residual 70 ml         Patients recent PSA values are as follows  No results found for: PSA, PSADIA     Recent BUN/Creatinine:  Lab Results   Component Value Date/Time    BUN 23 05/06/2022 10:48 AM    CREATININE 1.8 05/06/2022 10:48 AM       Radiology  The patient has had a KUB which I have independently reviewed along with its accompanying report. The study demonstrates bilateral nonobstructive nephrolithiasis. 2 on left and 4 on R. Assessment:   Bilateral nonobstructive nephrolithiasis      Plan:     Pt has bilateral nonobstructive nephrolithiasis. We discussed continued monitoring as he has passed stones spontaneously previously vs consideration of possible elective ESWL or ureteroscopy. Will review imaging with Dr. Michael Suarez and call pt with further recommendations.

## 2022-09-09 ENCOUNTER — TELEPHONE (OUTPATIENT)
Dept: UROLOGY | Age: 47
End: 2022-09-09

## 2022-09-09 ASSESSMENT — ENCOUNTER SYMPTOMS
NAUSEA: 0
BACK PAIN: 0
VOMITING: 0
ABDOMINAL PAIN: 0

## 2022-09-09 NOTE — TELEPHONE ENCOUNTER
Attempted to call pt to discuss recommendations regarding possible elective stone treatment. Will attempt to call again next week.

## 2022-09-13 NOTE — TELEPHONE ENCOUNTER
After reviewing with Dr. Jonnie Hopkins, discussed possible ESWL treatment of stones with Solitario Suero. Discussed consideration of treating L nonobstructive stones with ESWL and that we can also consider bilateral ESWL with the knowledge it does increase the risk of steinstrasse. I described the procedure in detail and also described the associated risks and benefits at length. We discussed possible alternative therapies. We discussed the risks and benefits of not undergoing therapy. Patient understands these risks and benefits and desires to proceed. Solitario Suero would like to proceed with bilateral treatment of his nonobstructive stones with bilateral ESWL. Office to facilitate scheduling.

## 2022-09-14 ENCOUNTER — TELEPHONE (OUTPATIENT)
Dept: UROLOGY | Age: 47
End: 2022-09-14

## 2022-09-14 DIAGNOSIS — N20.0 KIDNEY STONE: Primary | ICD-10-CM

## 2022-09-14 DIAGNOSIS — M10.00 IDIOPATHIC GOUT, UNSPECIFIED CHRONICITY, UNSPECIFIED SITE: ICD-10-CM

## 2022-09-14 DIAGNOSIS — Z01.818 PRE-OP TESTING: ICD-10-CM

## 2022-09-15 RX ORDER — ALLOPURINOL 300 MG/1
TABLET ORAL
Qty: 90 TABLET | Refills: 3 | Status: SHIPPED | OUTPATIENT
Start: 2022-09-15

## 2022-10-21 ENCOUNTER — OFFICE VISIT (OUTPATIENT)
Dept: FAMILY MEDICINE CLINIC | Age: 47
End: 2022-10-21
Payer: COMMERCIAL

## 2022-10-21 VITALS
DIASTOLIC BLOOD PRESSURE: 70 MMHG | SYSTOLIC BLOOD PRESSURE: 132 MMHG | BODY MASS INDEX: 38.32 KG/M2 | WEIGHT: 273.7 LBS | RESPIRATION RATE: 16 BRPM | HEIGHT: 71 IN | HEART RATE: 76 BPM

## 2022-10-21 DIAGNOSIS — Z00.00 WELL ADULT HEALTH CHECK: Primary | ICD-10-CM

## 2022-10-21 DIAGNOSIS — E66.9 OBESITY (BMI 30-39.9): ICD-10-CM

## 2022-10-21 DIAGNOSIS — M10.00 IDIOPATHIC GOUT, UNSPECIFIED CHRONICITY, UNSPECIFIED SITE: ICD-10-CM

## 2022-10-21 DIAGNOSIS — E78.00 PURE HYPERCHOLESTEROLEMIA: ICD-10-CM

## 2022-10-21 DIAGNOSIS — I10 ESSENTIAL HYPERTENSION: ICD-10-CM

## 2022-10-21 DIAGNOSIS — Z12.11 SCREEN FOR COLON CANCER: ICD-10-CM

## 2022-10-21 PROCEDURE — 99396 PREV VISIT EST AGE 40-64: CPT | Performed by: FAMILY MEDICINE

## 2022-10-21 SDOH — ECONOMIC STABILITY: FOOD INSECURITY: WITHIN THE PAST 12 MONTHS, THE FOOD YOU BOUGHT JUST DIDN'T LAST AND YOU DIDN'T HAVE MONEY TO GET MORE.: NEVER TRUE

## 2022-10-21 SDOH — ECONOMIC STABILITY: FOOD INSECURITY: WITHIN THE PAST 12 MONTHS, YOU WORRIED THAT YOUR FOOD WOULD RUN OUT BEFORE YOU GOT MONEY TO BUY MORE.: NEVER TRUE

## 2022-10-21 ASSESSMENT — PATIENT HEALTH QUESTIONNAIRE - PHQ9
SUM OF ALL RESPONSES TO PHQ QUESTIONS 1-9: 0
SUM OF ALL RESPONSES TO PHQ QUESTIONS 1-9: 0
2. FEELING DOWN, DEPRESSED OR HOPELESS: 0
SUM OF ALL RESPONSES TO PHQ QUESTIONS 1-9: 0
SUM OF ALL RESPONSES TO PHQ9 QUESTIONS 1 & 2: 0
1. LITTLE INTEREST OR PLEASURE IN DOING THINGS: 0
SUM OF ALL RESPONSES TO PHQ QUESTIONS 1-9: 0

## 2022-10-21 ASSESSMENT — ENCOUNTER SYMPTOMS
GASTROINTESTINAL NEGATIVE: 1
RESPIRATORY NEGATIVE: 1

## 2022-10-21 ASSESSMENT — SOCIAL DETERMINANTS OF HEALTH (SDOH): HOW HARD IS IT FOR YOU TO PAY FOR THE VERY BASICS LIKE FOOD, HOUSING, MEDICAL CARE, AND HEATING?: NOT HARD AT ALL

## 2022-10-21 NOTE — PROGRESS NOTES
10/21/2022    Mirian Zafar (:  1975) is a 56 y.o. male, here for a preventive medicine evaluation. Chief Complaint   Patient presents with    Annual Exam     Annual eval.     BP Readings from Last 3 Encounters:   10/21/22 132/70   22 139/81   22 138/84     Wt Readings from Last 3 Encounters:   10/21/22 273 lb 11.2 oz (124.1 kg)   22 274 lb (124.3 kg)   22 247 lb (112 kg)     No concerns. Patient Active Problem List   Diagnosis    Hyperhidrosis    Gout    Hyperlipidemia       Review of Systems   Constitutional: Negative. HENT: Negative. Respiratory: Negative. Cardiovascular: Negative. Gastrointestinal: Negative. Musculoskeletal: Negative. All other systems reviewed and are negative. Prior to Visit Medications    Medication Sig Taking? Authorizing Provider   allopurinol (ZYLOPRIM) 300 MG tablet TAKE 1 TABLET DAILY Yes Kahlil Carver DO   tamsulosin [facility]) 0.4 mg capsule take 1 capsule by mouth once daily Yes LORRAINE Douglas CNP   amLODIPine-benazepril (LOTREL) 10-20 MG per capsule TAKE 1 CAPSULE DAILY Yes Kahlil Carver DO   ketorolac (TORADOL) 10 MG tablet Take 1 tablet by mouth every 6 hours as needed for Pain Yes LORRAINE Douglas CNP   ondansetron (ZOFRAN-ODT) 4 MG disintegrating tablet Take 1 tablet by mouth 3 times daily as needed for Nausea or Vomiting Yes LORRAINE Douglas CNP        No Known Allergies    Past Medical History:   Diagnosis Date    Gout     Hypertension        Past Surgical History:   Procedure Laterality Date    TONSILLECTOMY      as a child       Social History     Socioeconomic History    Marital status:      Spouse name: Eliseo Mejia    Number of children: 3    Years of education: 16    Highest education level:  Bachelor's degree (e.g., BA, AB, BS)   Occupational History    Not on file   Tobacco Use    Smoking status: Never    Smokeless tobacco: Never Substance and Sexual Activity    Alcohol use: No    Drug use: No    Sexual activity: Not on file   Other Topics Concern    Not on file   Social History Narrative    Not on file     Social Determinants of Health     Financial Resource Strain: Low Risk     Difficulty of Paying Living Expenses: Not hard at all   Food Insecurity: No Food Insecurity    Worried About Running Out of Food in the Last Year: Never true    Ran Out of Food in the Last Year: Never true   Transportation Needs: Not on file   Physical Activity: Not on file   Stress: Not on file   Social Connections: Not on file   Intimate Partner Violence: Not on file   Housing Stability: Not on file        Family History   Problem Relation Age of Onset    High Blood Pressure Father        ADVANCE DIRECTIVE: N, <no information>    Vitals:    10/21/22 0742   BP: 132/70   Site: Left Upper Arm   Position: Sitting   Cuff Size: Large Adult   Pulse: 76   Resp: 16   Weight: 273 lb 11.2 oz (124.1 kg)   Height: 5' 11\" (1.803 m)     Estimated body mass index is 38.17 kg/m² as calculated from the following:    Height as of this encounter: 5' 11\" (1.803 m). Weight as of this encounter: 273 lb 11.2 oz (124.1 kg). Physical Exam  Vitals and nursing note reviewed. Constitutional:       General: He is not in acute distress. Appearance: Normal appearance. He is well-developed. HENT:      Head: Normocephalic and atraumatic. Right Ear: Tympanic membrane normal.      Left Ear: Tympanic membrane normal.   Eyes:      Conjunctiva/sclera: Conjunctivae normal.   Cardiovascular:      Rate and Rhythm: Normal rate and regular rhythm. Heart sounds: Normal heart sounds. No murmur heard. Pulmonary:      Effort: Pulmonary effort is normal.      Breath sounds: Normal breath sounds. No wheezing, rhonchi or rales. Abdominal:      General: There is no distension. Musculoskeletal:      Cervical back: Neck supple. Skin:     General: Skin is warm and dry.       Findings: No rash (on exposed surfaces). Neurological:      General: No focal deficit present. Mental Status: He is alert. Psychiatric:         Attention and Perception: Attention normal.         Mood and Affect: Mood normal.         Speech: Speech normal.         Behavior: Behavior normal. Behavior is cooperative. Thought Content: Thought content normal.         Judgment: Judgment normal.       No flowsheet data found.     Lab Results   Component Value Date/Time    CHOL 181 02/09/2021 08:53 AM    CHOL 195 08/26/2016 12:00 AM    CHOL 187 02/10/2015 10:00 AM    TRIG 318 02/09/2021 08:53 AM    TRIG 379 08/26/2016 12:00 AM    TRIG 403 02/10/2015 10:00 AM    HDL 31 02/09/2021 08:53 AM    HDL 33 08/26/2016 12:00 AM    HDL 29 02/10/2015 10:00 AM    LDLCALC 86 02/09/2021 08:53 AM    LDLCALC 86 08/26/2016 12:00 AM    LDLCALC SEE BELOW 02/10/2015 10:00 AM    GLUCOSE 102 05/06/2022 10:48 AM    LABA1C 5.2 02/09/2021 08:53 AM       The 10-year ASCVD risk score (J Luis DOWNS, et al., 2019) is: 4.2%    Values used to calculate the score:      Age: 56 years      Sex: Male      Is Non- : No      Diabetic: No      Tobacco smoker: No      Systolic Blood Pressure: 212 mmHg      Is BP treated: Yes      HDL Cholesterol: 31 mg/dL      Total Cholesterol: 181 mg/dL    Immunization History   Administered Date(s) Administered    COVID-19, MODERNA BLUE border, Primary or Immunocompromised, (age 12y+), IM, 100 mcg/0.5mL 02/02/2021, 03/06/2021, 12/17/2021    Influenza A (R1E5-21) Vaccine PF IM 02/04/2010    Tdap (Boostrix, Adacel) 02/01/2013       Health Maintenance   Topic Date Due    HIV screen  Never done    Hepatitis C screen  Never done    Colorectal Cancer Screen  Never done    Depression Screen  02/01/2022    COVID-19 Vaccine (4 - Booster for Moderna series) 02/11/2022    Flu vaccine (1) 08/01/2022    DTaP/Tdap/Td vaccine (2 - Td or Tdap) 02/01/2023    Diabetes screen  02/09/2024    Lipids  02/09/2026 Hepatitis A vaccine  Aged Out    Hib vaccine  Aged Out    Meningococcal (ACWY) vaccine  Aged Out    Pneumococcal 0-64 years Vaccine  Aged Out       Assessment & Plan   Well adult health check  Idiopathic gout, unspecified chronicity, unspecified site  Essential hypertension  Pure hypercholesterolemia  Obesity (BMI 30-39. 9)  Screen for colon cancer  -     Fecal DNA Colorectal cancer screening (Cologuard)    -  Healthy lifestyle discussed  -  Chronic medical problems stable  -  Continue current medications  -  Follow up with specialists as scheduled  -  Send for Cologuard    Return in about 1 year (around 10/21/2023) for Wellness.          --Lina Osborne,

## 2022-11-01 NOTE — TELEPHONE ENCOUNTER
Patient scheduled with DR. Raven Dennis on 12/14/2022. Surgery consent to be done upon arrival.  Patient to do urine culture and fasting labs on 11/30/2022; do EKG now; orders mailed to patient. Surgery instructions mailed to patient. Patient will have an adult over the age of 25 with them at discharge and 24 hours after procedure.       Next med conf# [id number] per [facility]

## 2022-11-01 NOTE — TELEPHONE ENCOUNTER
DO NOT TAKE ASPIRIN,  FISH OIL,  IBUPROFEN, MOTRIN-LIKE DRUGS AND ANY MULTIVITAMINS OR OVER THE COUNTER SUPPLEMENTS 14 DAYS PRIOR TO SURGERY. MUST HAVE AN ADULT OVER THE AGE OF 18 WITH YOU AT THE TIME OF THE PROCEDURE AND WITH YOU AT HOME AFTER THE PROCEDURE FOR 24 HOURS       Pretty Garciale 1975 Diagnosis:     Surgical Physician: Dr. Pickard Blocker have been scheduled for the procedure marked below:      Surgery: Bilateral extracorporeal shock wave lithotripsy          Date: 12/14/2022     Anesthesia: Anesthesiologist (General/Spinal)     Place of Service: [address] [place] [address] Second Floor Same Day Surgery         Arrive to same day surgery by:  6:00am  (Surgery time is subject to change)      INSTRUCTIONS AS MARKED BELOW:    1.  DO NOT eat or drink anything after midnight before surgery. 2.  We prefer you shower or bathe with an antibacterial soap (Dial) the morning of surgery. 3  Please bring a current medication list, photo ID and insurance card(s) with you  4. Okay to take Tylenol  5. If you take Glucophage, Metformin or Janumet, hold 48-hours prior to surgery  6. Take blood pressure or heart medication as directed, if taken in the morning take with a small sip of water  7. The office will call you in 1-2 days after your procedure to schedule a follow up. DATE SENSITIVE TESTING-DO ON THE DATE LISTED*WALK IN *NO APPOINTMENT    DO EKG NOW. ORDERS INCLUDED    DO URINE CULTURE AND FASTING LABS ON 11/30/2022. ORDERS INCLUDED.         Date: 11/1/2022

## 2022-11-01 NOTE — TELEPHONE ENCOUNTER
SURGERY [address]  [address] [place] [address] [address] [place] [facility], [address]      Phone *[phone] *[phone]   Surgical Scheduling Direct Line Phone *[phone] Fax *[phone]      Aminta Titus 1975 male    [address]  [place]   Marital Status:          Home Phone: [phone]      Cell Phone:    Telephone Information:   Mobile [phone]          Surgeon: Dr. Jaja Harrell Surgery Date: 12/14/2022   Time: 7:30am    Procedure: Bilateral extracorporeal shock wave lithotripsy     Diagnosis: kidney stone     Important Medical History:  In [facility]    Special Inst/Equip: Regular                            Next Med Conf# [id number]    CPT Codes:    99268  Latex Allergy: No     Cardiac Device:  No    Anesthesia:  General          Admission Type:  Same Day                        Admit Prior to Day of Surgery: No    Case Location:  Main OR            Preadmission Testing:  Phone Call          PAT Date and Time:______________________________________________________    PAT Confirmation #: ______________________________________________________    Post Op Visit: ___________________________________________________________    Need Preop Cardiac Clearance: No    Does Patient have Cardiologist/physician?     none    Surgery Confirmation #: __________________________________________________    : ________________________   Date: __________________________     [employer] Name: Saintclair Abu

## 2022-11-15 ENCOUNTER — HOSPITAL ENCOUNTER (OUTPATIENT)
Age: 47
Discharge: HOME | End: 2022-11-15
Payer: COMMERCIAL

## 2022-11-15 DIAGNOSIS — Z01.818 PRE-OP TESTING: ICD-10-CM

## 2022-11-15 DIAGNOSIS — N20.0 KIDNEY STONE: ICD-10-CM

## 2022-11-15 LAB
EKG ATRIAL RATE: 65 BPM
EKG P AXIS: 15 DEGREES
EKG P-R INTERVAL: 144 MS
EKG Q-T INTERVAL: 420 MS
EKG QRS DURATION: 94 MS
EKG QTC CALCULATION (BAZETT): 436 MS
EKG R AXIS: 53 DEGREES
EKG T AXIS: 23 DEGREES
EKG VENTRICULAR RATE: 65 BPM

## 2022-11-15 PROCEDURE — 93005 ELECTROCARDIOGRAM TRACING: CPT

## 2022-11-15 PROCEDURE — 93010 ELECTROCARDIOGRAM REPORT: CPT | Performed by: INTERNAL MEDICINE

## 2022-12-01 ENCOUNTER — PREP FOR PROCEDURE (OUTPATIENT)
Dept: UROLOGY | Age: 47
End: 2022-12-01

## 2022-12-01 ENCOUNTER — HOSPITAL ENCOUNTER (OUTPATIENT)
Age: 47
Discharge: HOME | End: 2022-12-01
Payer: COMMERCIAL

## 2022-12-01 DIAGNOSIS — Z01.818 PRE-OP TESTING: ICD-10-CM

## 2022-12-01 DIAGNOSIS — N20.0 KIDNEY STONE: ICD-10-CM

## 2022-12-01 LAB
ANION GAP SERPL CALCULATED.3IONS-SCNC: 12 MEQ/L (ref 8–16)
BASOPHILS # BLD: 0.8 %
BASOPHILS ABSOLUTE: 0 THOU/MM3 (ref 0–0.1)
BUN BLDV-MCNC: 15 MG/DL (ref 7–22)
CALCIUM SERPL-MCNC: 9.6 MG/DL (ref 8.5–10.5)
CHLORIDE BLD-SCNC: 103 MEQ/L (ref 98–111)
CO2: 26 MEQ/L (ref 23–33)
CREAT SERPL-MCNC: 1.1 MG/DL (ref 0.4–1.2)
EOSINOPHIL # BLD: 1 %
EOSINOPHILS ABSOLUTE: 0.1 THOU/MM3 (ref 0–0.4)
ERYTHROCYTE [DISTWIDTH] IN BLOOD BY AUTOMATED COUNT: 13.3 % (ref 11.5–14.5)
ERYTHROCYTE [DISTWIDTH] IN BLOOD BY AUTOMATED COUNT: 43.3 FL (ref 35–45)
GFR SERPL CREATININE-BSD FRML MDRD: > 60 ML/MIN/1.73M2
GLUCOSE BLD-MCNC: 92 MG/DL (ref 70–108)
HCT VFR BLD CALC: 46.5 % (ref 42–52)
HEMOGLOBIN: 15.1 GM/DL (ref 14–18)
IMMATURE GRANS (ABS): 0.01 THOU/MM3 (ref 0–0.07)
IMMATURE GRANULOCYTES: 0.2 %
LYMPHOCYTES # BLD: 16.8 %
LYMPHOCYTES ABSOLUTE: 1 THOU/MM3 (ref 1–4.8)
MCH RBC QN AUTO: 29 PG (ref 26–33)
MCHC RBC AUTO-ENTMCNC: 32.5 GM/DL (ref 32.2–35.5)
MCV RBC AUTO: 89.4 FL (ref 80–94)
MONOCYTES # BLD: 7.3 %
MONOCYTES ABSOLUTE: 0.4 THOU/MM3 (ref 0.4–1.3)
NUCLEATED RED BLOOD CELLS: 0 /100 WBC
PLATELET # BLD: 283 THOU/MM3 (ref 130–400)
PMV BLD AUTO: 10.6 FL (ref 9.4–12.4)
POTASSIUM SERPL-SCNC: 4.1 MEQ/L (ref 3.5–5.2)
RBC # BLD: 5.2 MILL/MM3 (ref 4.7–6.1)
SEG NEUTROPHILS: 73.9 %
SEGMENTED NEUTROPHILS ABSOLUTE COUNT: 4.4 THOU/MM3 (ref 1.8–7.7)
SODIUM BLD-SCNC: 141 MEQ/L (ref 135–145)
WBC # BLD: 6 THOU/MM3 (ref 4.8–10.8)

## 2022-12-01 PROCEDURE — 36415 COLL VENOUS BLD VENIPUNCTURE: CPT

## 2022-12-01 PROCEDURE — 87086 URINE CULTURE/COLONY COUNT: CPT

## 2022-12-01 PROCEDURE — 85025 COMPLETE CBC W/AUTO DIFF WBC: CPT

## 2022-12-01 PROCEDURE — 80048 BASIC METABOLIC PNL TOTAL CA: CPT

## 2022-12-03 LAB — URINE CULTURE, ROUTINE: NORMAL

## 2022-12-05 RX ORDER — SODIUM CHLORIDE 0.9 % (FLUSH) 0.9 %
5-40 SYRINGE (ML) INJECTION PRN
Status: CANCELLED | OUTPATIENT
Start: 2022-12-05

## 2022-12-05 RX ORDER — SODIUM CHLORIDE 0.9 % (FLUSH) 0.9 %
5-40 SYRINGE (ML) INJECTION EVERY 12 HOURS SCHEDULED
Status: CANCELLED | OUTPATIENT
Start: 2022-12-05

## 2022-12-05 RX ORDER — SODIUM CHLORIDE 9 MG/ML
INJECTION, SOLUTION INTRAVENOUS PRN
Status: CANCELLED | OUTPATIENT
Start: 2022-12-05

## 2022-12-06 ENCOUNTER — TELEPHONE (OUTPATIENT)
Dept: UROLOGY | Age: 47
End: 2022-12-06

## 2022-12-06 NOTE — PROGRESS NOTES
PAT Call Date: LM 12/6  Surgery Date: 12/14    Surgeon: Hernesto Lobe   Surgery: ESWL    Is patient from a nursing home? No   Any Isolation Precautions? No   Any Pacemaker or ICD? No If YES, has it been checked recently and where? Has the rep been notified? No     On Snapboard?  No     Hard Copy on Chart  In EPIC Pending/Notes   Consent -   Within 30 days; signed, dated & timed by patient and physician     [x] On Arrival     [] Blood    Additional Consent Needs:     H&P - Within 30 days    [] Physician To Do     [] H&P Update - If H&P is older then 24 hours    Clearance -  Medical, Cardiac, Pulmonary, etc.       Orders - Signed and Dated    Copy Sent to Pharm []  12/1  [] Physician To Do    Labs - Within 3 months   12/1  [x] CBC -ok   [x] BMP-ok   [x] GFR-ok   [] INR    [] PTT    [x] Urine -ok   [] Liver Enzymes    [] Kidney Function    [] MRSA Nasal   [] MSSA      Others:    Radiology Studies-   Within 1 year    [] Chest X-Ray   [] MRI    [] CT    EKG -   Within 1 year, unless hx of HTN  11/15 NSR   Cardiac Workup -   Stress Test, Echo, Cath within 18 months    [] Cath                                [] Stress Test                      [] Echo    [] Holter Monitor    [] SHENA

## 2022-12-09 NOTE — PROGRESS NOTES
Follow all instructions given by your physician  NPO after midnight   Sips of water am of surgery with allowed medications  Bring insurance info and 's license  Wear comfortable clean, loose fitting clothing  No jewelry or contact lenses to be worn day of surgery  No glue on dentures morning of surgery;you will be asked to remove them for surgery. Case for glasses. Shower night before and morning of surgery with a liquid antibacterial soap, dry with fresh clean towel; no lotions, creams or powder. Clean sheets and pillow case on bed night before surgery  Bring medications in original bottles  Bring CPAP/BIPAP machine if you have one ( you may be charged if one is needed in recovery room )    Please refer to the SSI-Surgical Site Infection Flyer you hopefully received in the mail-together we can prevent infections; signs and symptoms reviewed. When discharged be sure you understand how to care for your wound and that you have the phone # to call if you have any concerns or questions about your wound.  needed at discharge and someone over 18 to stay with you for 24 hours overnight (surgery may be cancelled if you don't have this)  Report to [facility] [place] on 2nd floor  If you would become ill prior to surgery, please call the surgeon  May have a visitor with you, we request that you limit to 2 visitors in pre-op area  Masks are recommended but not required, new masks at entrance desk  Call [phone] for any questions    Covid questionnaire Complete; Patient negative for symptoms or exposure. See documentation.

## 2022-12-14 ENCOUNTER — ANESTHESIA (OUTPATIENT)
Dept: OPERATING ROOM | Age: 47
End: 2022-12-14
Payer: COMMERCIAL

## 2022-12-14 ENCOUNTER — ANESTHESIA EVENT (OUTPATIENT)
Dept: OPERATING ROOM | Age: 47
End: 2022-12-14
Payer: COMMERCIAL

## 2022-12-14 ENCOUNTER — HOSPITAL ENCOUNTER (OUTPATIENT)
Age: 47
Setting detail: OUTPATIENT SURGERY
Discharge: HOME | End: 2022-12-14
Attending: UROLOGY | Admitting: UROLOGY
Payer: COMMERCIAL

## 2022-12-14 VITALS
RESPIRATION RATE: 20 BRPM | BODY MASS INDEX: 35.84 KG/M2 | DIASTOLIC BLOOD PRESSURE: 65 MMHG | WEIGHT: 256 LBS | SYSTOLIC BLOOD PRESSURE: 114 MMHG | TEMPERATURE: 96 F | HEART RATE: 62 BPM | HEIGHT: 71 IN | OXYGEN SATURATION: 96 %

## 2022-12-14 DIAGNOSIS — N20.0 KIDNEY STONE: Primary | ICD-10-CM

## 2022-12-14 PROCEDURE — 2580000003 HC RX 258: Performed by: UROLOGY

## 2022-12-14 PROCEDURE — 6360000002 HC RX W HCPCS: Performed by: NURSE ANESTHETIST, CERTIFIED REGISTERED

## 2022-12-14 PROCEDURE — 3700000000 HC ANESTHESIA ATTENDED CARE: Performed by: UROLOGY

## 2022-12-14 PROCEDURE — 7100000010 HC PHASE II RECOVERY - FIRST 15 MIN: Performed by: UROLOGY

## 2022-12-14 PROCEDURE — 3600000002 HC SURGERY LEVEL 2 BASE: Performed by: UROLOGY

## 2022-12-14 PROCEDURE — 3700000001 HC ADD 15 MINUTES (ANESTHESIA): Performed by: UROLOGY

## 2022-12-14 PROCEDURE — 7100000001 HC PACU RECOVERY - ADDTL 15 MIN: Performed by: UROLOGY

## 2022-12-14 PROCEDURE — 7100000000 HC PACU RECOVERY - FIRST 15 MIN: Performed by: UROLOGY

## 2022-12-14 PROCEDURE — 2500000003 HC RX 250 WO HCPCS: Performed by: NURSE ANESTHETIST, CERTIFIED REGISTERED

## 2022-12-14 PROCEDURE — 3600000012 HC SURGERY LEVEL 2 ADDTL 15MIN: Performed by: UROLOGY

## 2022-12-14 PROCEDURE — 7100000011 HC PHASE II RECOVERY - ADDTL 15 MIN: Performed by: UROLOGY

## 2022-12-14 PROCEDURE — 6360000002 HC RX W HCPCS: Performed by: UROLOGY

## 2022-12-14 RX ORDER — GLYCOPYRROLATE 1 MG/5 ML
SYRINGE (ML) INTRAVENOUS PRN
Status: DISCONTINUED | OUTPATIENT
Start: 2022-12-14 | End: 2022-12-14 | Stop reason: SDUPTHER

## 2022-12-14 RX ORDER — EPHEDRINE SULFATE 50 MG/ML
INJECTION INTRAVENOUS PRN
Status: DISCONTINUED | OUTPATIENT
Start: 2022-12-14 | End: 2022-12-14 | Stop reason: SDUPTHER

## 2022-12-14 RX ORDER — FENTANYL CITRATE 50 UG/ML
25 INJECTION, SOLUTION INTRAMUSCULAR; INTRAVENOUS EVERY 5 MIN PRN
Status: DISCONTINUED | OUTPATIENT
Start: 2022-12-14 | End: 2022-12-14 | Stop reason: HOSPADM

## 2022-12-14 RX ORDER — LIDOCAINE HYDROCHLORIDE 20 MG/ML
INJECTION, SOLUTION EPIDURAL; INFILTRATION; INTRACAUDAL; PERINEURAL PRN
Status: DISCONTINUED | OUTPATIENT
Start: 2022-12-14 | End: 2022-12-14 | Stop reason: SDUPTHER

## 2022-12-14 RX ORDER — ONDANSETRON 2 MG/ML
INJECTION INTRAMUSCULAR; INTRAVENOUS PRN
Status: DISCONTINUED | OUTPATIENT
Start: 2022-12-14 | End: 2022-12-14 | Stop reason: SDUPTHER

## 2022-12-14 RX ORDER — FENTANYL CITRATE 50 UG/ML
INJECTION, SOLUTION INTRAMUSCULAR; INTRAVENOUS PRN
Status: DISCONTINUED | OUTPATIENT
Start: 2022-12-14 | End: 2022-12-14 | Stop reason: SDUPTHER

## 2022-12-14 RX ORDER — ONDANSETRON 2 MG/ML
4 INJECTION INTRAMUSCULAR; INTRAVENOUS
Status: DISCONTINUED | OUTPATIENT
Start: 2022-12-14 | End: 2022-12-14 | Stop reason: HOSPADM

## 2022-12-14 RX ORDER — DEXAMETHASONE SODIUM PHOSPHATE 10 MG/ML
INJECTION, EMULSION INTRAMUSCULAR; INTRAVENOUS PRN
Status: DISCONTINUED | OUTPATIENT
Start: 2022-12-14 | End: 2022-12-14 | Stop reason: SDUPTHER

## 2022-12-14 RX ORDER — SODIUM CHLORIDE 0.9 % (FLUSH) 0.9 %
5-40 SYRINGE (ML) INJECTION EVERY 12 HOURS SCHEDULED
Status: DISCONTINUED | OUTPATIENT
Start: 2022-12-14 | End: 2022-12-14 | Stop reason: HOSPADM

## 2022-12-14 RX ORDER — MIDAZOLAM HYDROCHLORIDE 1 MG/ML
INJECTION INTRAMUSCULAR; INTRAVENOUS PRN
Status: DISCONTINUED | OUTPATIENT
Start: 2022-12-14 | End: 2022-12-14 | Stop reason: SDUPTHER

## 2022-12-14 RX ORDER — SODIUM CHLORIDE 0.9 % (FLUSH) 0.9 %
5-40 SYRINGE (ML) INJECTION PRN
Status: DISCONTINUED | OUTPATIENT
Start: 2022-12-14 | End: 2022-12-14 | Stop reason: HOSPADM

## 2022-12-14 RX ORDER — MEPERIDINE HYDROCHLORIDE 25 MG/ML
12.5 INJECTION INTRAMUSCULAR; INTRAVENOUS; SUBCUTANEOUS EVERY 5 MIN PRN
Status: DISCONTINUED | OUTPATIENT
Start: 2022-12-14 | End: 2022-12-14 | Stop reason: HOSPADM

## 2022-12-14 RX ORDER — PROPOFOL 10 MG/ML
INJECTION, EMULSION INTRAVENOUS PRN
Status: DISCONTINUED | OUTPATIENT
Start: 2022-12-14 | End: 2022-12-14 | Stop reason: SDUPTHER

## 2022-12-14 RX ORDER — SODIUM CHLORIDE 9 MG/ML
INJECTION, SOLUTION INTRAVENOUS PRN
Status: DISCONTINUED | OUTPATIENT
Start: 2022-12-14 | End: 2022-12-14 | Stop reason: HOSPADM

## 2022-12-14 RX ORDER — FENTANYL CITRATE 50 UG/ML
50 INJECTION, SOLUTION INTRAMUSCULAR; INTRAVENOUS EVERY 5 MIN PRN
Status: DISCONTINUED | OUTPATIENT
Start: 2022-12-14 | End: 2022-12-14 | Stop reason: HOSPADM

## 2022-12-14 RX ORDER — TAMSULOSIN HYDROCHLORIDE 0.4 MG/1
0.4 CAPSULE ORAL DAILY
Qty: 30 CAPSULE | Refills: 1 | Status: SHIPPED | OUTPATIENT
Start: 2022-12-14

## 2022-12-14 RX ORDER — TRAMADOL HYDROCHLORIDE 50 MG/1
50 TABLET ORAL EVERY 6 HOURS PRN
Qty: 20 TABLET | Refills: 0 | Status: SHIPPED | OUTPATIENT
Start: 2022-12-14 | End: 2022-12-19

## 2022-12-14 RX ORDER — KETOROLAC TROMETHAMINE 30 MG/ML
INJECTION, SOLUTION INTRAMUSCULAR; INTRAVENOUS PRN
Status: DISCONTINUED | OUTPATIENT
Start: 2022-12-14 | End: 2022-12-14 | Stop reason: SDUPTHER

## 2022-12-14 RX ADMIN — EPHEDRINE SULFATE 15 MG: 50 INJECTION, SOLUTION INTRAVENOUS at 08:39

## 2022-12-14 RX ADMIN — MIDAZOLAM 2 MG: 1 INJECTION INTRAMUSCULAR; INTRAVENOUS at 07:57

## 2022-12-14 RX ADMIN — SODIUM CHLORIDE: 9 INJECTION, SOLUTION INTRAVENOUS at 07:11

## 2022-12-14 RX ADMIN — FENTANYL CITRATE 100 MCG: 50 INJECTION, SOLUTION INTRAMUSCULAR; INTRAVENOUS at 07:57

## 2022-12-14 RX ADMIN — KETOROLAC TROMETHAMINE 30 MG: 30 INJECTION, SOLUTION INTRAMUSCULAR; INTRAVENOUS at 08:11

## 2022-12-14 RX ADMIN — LIDOCAINE HYDROCHLORIDE 100 MG: 20 INJECTION, SOLUTION EPIDURAL; INFILTRATION; INTRACAUDAL; PERINEURAL at 07:57

## 2022-12-14 RX ADMIN — DEXAMETHASONE SODIUM PHOSPHATE 8 MG: 10 INJECTION, EMULSION INTRAMUSCULAR; INTRAVENOUS at 08:05

## 2022-12-14 RX ADMIN — CEFAZOLIN 2000 MG: 10 INJECTION, POWDER, FOR SOLUTION INTRAVENOUS at 07:57

## 2022-12-14 RX ADMIN — ONDANSETRON 4 MG: 2 INJECTION INTRAMUSCULAR; INTRAVENOUS at 08:10

## 2022-12-14 RX ADMIN — SODIUM CHLORIDE: 9 INJECTION, SOLUTION INTRAVENOUS at 08:42

## 2022-12-14 RX ADMIN — PROPOFOL 150 MG: 10 INJECTION, EMULSION INTRAVENOUS at 07:57

## 2022-12-14 RX ADMIN — Medication 0.2 MG: at 08:29

## 2022-12-14 ASSESSMENT — PAIN - FUNCTIONAL ASSESSMENT: PAIN_FUNCTIONAL_ASSESSMENT: 0-10

## 2022-12-14 ASSESSMENT — ENCOUNTER SYMPTOMS
BACK PAIN: 0
NAUSEA: 0
VOMITING: 0
ABDOMINAL PAIN: 0

## 2022-12-14 ASSESSMENT — PAIN SCALES - GENERAL: PAINLEVEL_OUTOF10: 0

## 2022-12-14 NOTE — ANESTHESIA PRE PROCEDURE
Department of Anesthesiology  Preprocedure Note       Name:  Melly Light   Age:  56 y.o.  :  1975                                          MRN:  [id number]         Date:  2022      Surgeon: Adilson Ramirez):  Isiah Riley MD    Procedure: Procedure(s):  ESWL EXTRACORPOREAL SHOCK WAVE LITHOTRIPSY    Medications prior to admission:   Prior to Admission medications    Medication Sig Start Date End Date Taking?  Authorizing Provider   allopurinol (ZYLOPRIM) 300 MG tablet TAKE 1 TABLET DAILY 9/15/22   Cortez Fam DO   tamsulosin [facility]) 0.4 mg capsule take 1 capsule by mouth once daily  Patient not taking: Reported on 2022   ThomasLORRAINE Fowler CNP   amLODIPine-benazepril (LOTREL) 10-20 MG per capsule TAKE 1 CAPSULE DAILY 22   Cortez Fam DO   ketorolac (TORADOL) 10 MG tablet Take 1 tablet by mouth every 6 hours as needed for Pain  Patient not taking: Reported on 2022   LORRAINE Ortiz CNP   ondansetron (ZOFRAN-ODT) 4 MG disintegrating tablet Take 1 tablet by mouth 3 times daily as needed for Nausea or Vomiting  Patient not taking: Reported on 2022   ThomasLORRAINE Fowler CNP       Current medications:    Current Facility-Administered Medications   Medication Dose Route Frequency Provider Last Rate Last Admin    sodium chloride flush 0.9 % injection 5-40 mL  5-40 mL IntraVENous 2 times per day Isiah Riley MD        sodium chloride flush 0.9 % injection 5-40 mL  5-40 mL IntraVENous PRN Isiah Riley MD        0.9 % sodium chloride infusion   IntraVENous PRN Isiah Riley MD        ceFAZolin (ANCEF) 3000 mg in dextrose 5 % 100 mL IVPB  3,000 mg IntraVENous On Call to [address] [address],[facility], [place]           Allergies:  No Known Allergies    Problem List:    Patient Active Problem List   Diagnosis Code    Hyperhidrosis R61    Gout M10.9    Hyperlipidemia E78.5       Past Medical History:        Diagnosis Date  Gout     Hypertension        Past Surgical History:        Procedure Laterality Date    TONSILLECTOMY      as a child       Social History:    Social History     Tobacco Use    Smoking status: Never    Smokeless tobacco: Never   Substance Use Topics    Alcohol use: No                                Counseling given: Not Answered      Vital Signs (Current):   Vitals:    12/09/22 0954 12/14/22 0654 12/14/22 0657   BP:  132/75    Pulse:  54    Resp:  12    Temp:  (!) 96.7 °F (35.9 °C)    SpO2:  97%    Weight: 250 lb (113.4 kg)  256 lb (116.1 kg)   Height: 5' 11\" (1.803 m)  5' 11\" (1.803 m)                                              BP Readings from Last 3 Encounters:   12/14/22 132/75   10/21/22 132/70   09/07/22 139/81       NPO Status:                                                                                 BMI:   Wt Readings from Last 3 Encounters:   12/14/22 256 lb (116.1 kg)   10/21/22 273 lb 11.2 oz (124.1 kg)   09/07/22 274 lb (124.3 kg)     Body mass index is 35.7 kg/m². CBC:   Lab Results   Component Value Date/Time    WBC 6.0 12/01/2022 10:57 AM    RBC 5.20 12/01/2022 10:57 AM    HGB 15.1 12/01/2022 10:57 AM    HCT 46.5 12/01/2022 10:57 AM    MCV 89.4 12/01/2022 10:57 AM     12/01/2022 10:57 AM       CMP:   Lab Results   Component Value Date/Time     12/01/2022 10:57 AM    K 4.1 12/01/2022 10:57 AM     12/01/2022 10:57 AM    CO2 26 12/01/2022 10:57 AM    BUN 15 12/01/2022 10:57 AM    CREATININE 1.1 12/01/2022 10:57 AM    LABGLOM >60 12/01/2022 10:41 AM    GLUCOSE 92 12/01/2022 10:57 AM    PROT 7.1 08/20/2021 04:12 AM    CALCIUM 9.6 12/01/2022 10:57 AM    BILITOT 0.4 08/20/2021 04:12 AM    ALKPHOS 91 08/20/2021 04:12 AM    AST 33 08/20/2021 04:12 AM    ALT 58 08/20/2021 04:12 AM       POC Tests: No results for input(s): POCGLU, POCNA, POCK, POCCL, POCBUN, POCHEMO, POCHCT in the last 72 hours. Coags: No results found for: PROTIME, INR, APTT    HCG (If Applicable):  No results found for: PREGTESTUR, PREGSERUM, HCG, HCGQUANT     ABGs: No results found for: PHART, PO2ART, LLP6FFI, JBG8SDU, BEART, P9XTEKHG     Type & Screen (If Applicable):  No results found for: LABABO, LABRH    Drug/Infectious Status (If Applicable):  No results found for: HIV, HEPCAB    COVID-19 Screening (If Applicable): No results found for: COVID19        Anesthesia Evaluation  Patient summary reviewed and Nursing notes reviewed no history of anesthetic complications:   Airway: Mallampati: III  TM distance: >3 FB   Neck ROM: full  Mouth opening: > = 3 FB   Dental:          Pulmonary:Negative Pulmonary ROS and normal exam  breath sounds clear to auscultation                             Cardiovascular:  Exercise tolerance: good (>4 METS),   (+) hypertension:,       ECG reviewed                        Neuro/Psych:   Negative Neuro/Psych ROS              GI/Hepatic/Renal:   (+) renal disease: kidney stones, morbid obesity          Endo/Other: Negative Endo/Other ROS             Pt had no PAT visit       Abdominal:   (+) obese,     Abdomen: soft. Vascular: negative vascular ROS. Other Findings:           Anesthesia Plan      general     ASA 3       Induction: intravenous. MIPS: Postoperative opioids intended and Prophylactic antiemetics administered. Anesthetic plan and risks discussed with patient. Plan discussed with CRNA.                     [facility] Gillian, DO   12/14/2022

## 2022-12-14 NOTE — DISCHARGE INSTRUCTIONS
Oralia Woo,    Your procedure went well. We are able to treat all stones on your right. You have 2 large stones in your left that were very dense. We are able to treat the largest one in the lower part of your kidney very well. The one in the upper part of the kidney remains in place. Would like to see you back in 4 to 6 weeks with an x-ray to see the status of the stones were treated today. Happy holidays.

## 2022-12-14 NOTE — PROGRESS NOTES
1005  pt. Awake and oriented on arrival to phase II. Pt. Denies pain  bilateral mid back areas slightly pink. No open areas noted. Family at bedside. Call light in reach. 1015  pt. Eating and drinking without difficulty. 1100  pt. Out of bed and getting dressed without difficulty. Pt. Ambulated to bathroom without difficulty. 1120  discharge instructions given. Pt. Edyta Mijares understanding. 1130  phase II criteria met. Pt. Discharged per wheelchair. Pt. Stable. Personal belongings and discharge instructions with pt.

## 2022-12-14 NOTE — ANESTHESIA POSTPROCEDURE EVALUATION
Department of Anesthesiology  Postprocedure Note    Patient: José Carmichael  MRN: [id number]  YOB: 1975  Date of evaluation: 12/14/2022      Procedure Summary     Date: 12/14/22 Room / Location: [address] [facility] [place]    Anesthesia Start: 5650 Anesthesia Stop: 4340    Procedure: ESWL EXTRACORPOREAL SHOCK WAVE LITHOTRIPSY (Bilateral) Diagnosis:       Kidney stone      (Kidney stone [N20.0])    Surgeons: Debbie Gonzalez MD Responsible Provider: Merced Winter DO    Anesthesia Type: general ASA Status: 3          Anesthesia Type: No value filed.     Jose Phase I: Jose Score: 10    Jose Phase II:        Anesthesia Post Evaluation    Patient location during evaluation: PACU  Patient participation: complete - patient participated  Level of consciousness: awake and alert  Pain score: 3  Airway patency: patent  Nausea & Vomiting: no nausea and no vomiting  Complications: no  Cardiovascular status: hemodynamically stable and blood pressure returned to baseline  Respiratory status: spontaneous ventilation, room air and acceptable  Hydration status: stable

## 2022-12-14 NOTE — PROGRESS NOTES
ADMITTED TO [facility] AND ORIENTED TO UNIT. SCDS ON. FALL BANDS ON. PT VERBALIZED APPROVAL FOR FIRST NAME, LAST INITIAL AND PHYSICIAN NAME ON UNIT WHITEBOARD.

## 2022-12-14 NOTE — PROGRESS NOTES
0930 Pt admitted to PACU, VSS. Responds to verbal command. NAD. Mikhail hugger applied to increase temp. 9099 Pt more alert. Denies c/o pain or discomfort. 9333 Meets criteria for discharge from PACU.     1002 Transferred to [facility] on stretcher in stable condition.

## 2022-12-14 NOTE — H&P
[address] [address] [place], [place] [address]  Dept: [phone]  Loc: [phone]    Visit Date: 9/7/2022        HPI:     Bertrma Gunn is a 40 y.o. male who presents today for:  Chief Complaint   Patient presents with    Nephrolithiasis       HPI  Pt seen in follow up for kidney stones. [place] Vila has a hx of kidney stones with stones passed spontaneously in 2021 and 2022. KUB performed prior to today's appt. Mr. Waleska Harp denies any significant pain, hematuria, dysuria, or symptoms of stones currently. Current Outpatient Medications   Medication Sig Dispense Refill    tamsulosin (FLOMAX) 0.4 mg capsule take 1 capsule by mouth once daily 30 capsule 0    amLODIPine-benazepril (LOTREL) 10-20 MG per capsule TAKE 1 CAPSULE DAILY 90 capsule 3    ketorolac (TORADOL) 10 MG tablet Take 1 tablet by mouth every 6 hours as needed for Pain 20 tablet 0    ondansetron (ZOFRAN-ODT) 4 MG disintegrating tablet Take 1 tablet by mouth 3 times daily as needed for Nausea or Vomiting 21 tablet 0    allopurinol (ZYLOPRIM) 300 MG tablet Take 1 tablet by mouth daily 90 tablet 3     No current facility-administered medications for this visit. Past Medical History  [place] Vila  has a past medical history of Gout and Hypertension. Past Surgical History  The patient  has a past surgical history that includes Tonsillectomy. Family History  This patient's family history includes High Blood Pressure in his father. Social History  [place] Vila  reports that he has never smoked. He has never used smokeless tobacco. He reports that he does not drink alcohol and does not use drugs. Subjective:      Review of Systems   Constitutional:  Negative for activity change, appetite change, chills, diaphoresis, fatigue, fever and unexpected weight change. Gastrointestinal:  Negative for abdominal pain, nausea and vomiting.    Genitourinary:  Negative for decreased urine volume, difficulty urinating, dysuria, flank pain, frequency, hematuria and urgency. Musculoskeletal:  Negative for back pain. Objective:   /81   Pulse 65   Ht 5' 11\" (1.803 m)   Wt 274 lb (124.3 kg)   BMI 38.22 kg/m²     Physical Exam  Vitals reviewed. Constitutional:       General: He is not in acute distress. Appearance: Normal appearance. He is well-developed. He is not ill-appearing or diaphoretic. HENT:      Head: Normocephalic and atraumatic. Right Ear: External ear normal.      Left Ear: External ear normal.      Nose: Nose normal.      Mouth/Throat:      Mouth: Mucous membranes are moist.   Eyes:      General: No scleral icterus. Right eye: No discharge. Left eye: No discharge. Neck:      Vascular: No JVD. Trachea: No tracheal deviation. Pulmonary:      Effort: Pulmonary effort is normal. No respiratory distress. Abdominal:      General: There is no distension. Tenderness: There is no abdominal tenderness. There is no right CVA tenderness or left CVA tenderness. Musculoskeletal:         General: Normal range of motion. Skin:     General: Skin is warm and dry. Neurological:      Mental Status: He is alert and oriented to person, place, and time. Mental status is at baseline. Psychiatric:         Mood and Affect: Mood normal.         Behavior: Behavior normal.         Thought Content:  Thought content normal.       POC  Results for POC orders placed in visit on 09/07/22   POCT Urinalysis No Micro (Auto)   Result Value Ref Range    Glucose, Ur Negative NEGATIVE mg/dl    Bilirubin Urine Negative     Ketones, Urine Negative NEGATIVE    Specific Gravity, Urine >= 1.030 1.002 - 1.030    Blood, UA POC Negative NEGATIVE    pH, Urine 5.50 5.0 - 9.0    Protein, Urine Negative NEGATIVE mg/dl    Urobilinogen, Urine 0.20 0.0 - 1.0 eu/dl    Nitrite, Urine Negative NEGATIVE    Leukocyte Clumps, Urine Negative NEGATIVE    Color, Urine Yellow YELLOW-STRAW Character, Urine Clear CLR-SL.CLOUD   poct post void residual   Result Value Ref Range    post void residual 70 ml         Patients recent PSA values are as follows  No results found for: PSA, PSADIA     Recent BUN/Creatinine:  Lab Results   Component Value Date/Time    BUN 23 05/06/2022 10:48 AM    CREATININE 1.8 05/06/2022 10:48 AM       Radiology  The patient has had a KUB which I have independently reviewed along with its accompanying report. The study demonstrates bilateral nonobstructive nephrolithiasis. 2 on left and 4 on R. Assessment:   Bilateral nonobstructive nephrolithiasis      Plan:     Pt has bilateral nonobstructive nephrolithiasis. We discussed continued monitoring as he has passed stones spontaneously previously vs consideration of possible elective ESWL or ureteroscopy. Will review imaging with Dr. Selina Miller and call pt with further recommendations.

## 2022-12-14 NOTE — OP NOTE
Operative Note      Patient: Patricia Messina  YOB: 1975  MRN: [id number]    Date of Procedure: 12/14/2022    Pre-Op Diagnosis: Kidney stone [N20.0]    Post-Op Diagnosis: Same    Procedure:  Bilateral shockwave lithotripsy    Surgeon(s):  Rick Ruff MD    Assistant:   * No surgical staff found *    Anesthesia: General    Estimated Blood Loss (mL): Minimal    Complications: None    Specimens:   * No specimens in log *    Implants:  * No implants in log *      Drains: * No LDAs found *    Findings: 2 large left-sided stones. 3-4 medium to small size right-sided stones. Detailed Description of Procedure:   60-year-old male with a history of kidney stones who presents today for stone management. After risks and benefits alternatives were reviewed including observation he elected to proceed with bilateral shockwave procedure today. After informed sent was obtained the preoperative area using back to operating room and transferred after table in supine position. Anesthesia was used. Antibiotics given. He remained in supine position. At this time we focused on the right kidney. Total 3 stones were noted. We treated all 3 stones using a total of 3000 shocks. A 2-minute pause was completed. Her highest power level was 7. We then went to the left side. There were 2 stones. We started the lower pole stone. It was almost a centimeter in size. We pretreated the kidney with 200 shocks. 2-minute pause was completed. We then delivered a total of 3000 shocks to this stone. all 3000 shocks were required to fragment the stone given how large and at that was. Given that we could not use more than 3000 shocks we could not treat the upper pole stone. This was discussed with his wife. The patient was in awakened and discharged back to hospital's recovery in good stable condition. Follow-up:  Right-sided stones treated today. Lower pole left-sided stone treated.   Upper pole left-sided stone remains in situ. Will need to follow-up in 4 to 6 weeks with KUB to assess for residual stone burden and schedule for potential repeat left-sided treatment.     Electronically signed by Javi Rudd MD on 12/14/2022 at 10:51 AM

## 2022-12-19 ENCOUNTER — TELEPHONE (OUTPATIENT)
Dept: UROLOGY | Age: 47
End: 2022-12-19

## 2022-12-19 RX ORDER — DOCUSATE SODIUM 100 MG/1
100 CAPSULE, LIQUID FILLED ORAL 2 TIMES DAILY PRN
Qty: 60 CAPSULE | Refills: 0 | Status: SHIPPED | OUTPATIENT
Start: 2022-12-19 | End: 2023-01-18

## 2022-12-19 RX ORDER — SENNOSIDES 8.6 MG
1 TABLET ORAL DAILY PRN
Qty: 30 TABLET | Refills: 0 | Status: SHIPPED | OUTPATIENT
Start: 2022-12-19 | End: 2023-01-18

## 2022-12-19 NOTE — TELEPHONE ENCOUNTER
Patient advised to continue flomax, push fluids, continue pain medications as needed and use heating pad. He will also start the prescriptions for colace and senna. If he develops any symptoms listed below he will be evaluated in the ER.

## 2022-12-19 NOTE — TELEPHONE ENCOUNTER
Patient underwent Bilateral shockwave lithotripsy on 12/14/2022. He is still have a lot of discomfort in his abdomen. On Wednesday, he felt the stone fragments move. He felt good on Thursday but the pain has gotten worse since later that night. Rated 9 when the pain is at its worst on scale of 0-10. He feels a lot of bloating after drinking and eating. He has not had a bowel movement since Thursday and not passing much gas. The pain was in the right lower back, and Friday the pain changed to be more in the front generally on the right side. On the left side the stone was not able to be broken up. The left side is not bothering him and he denies pain on the left. He did see fragments in the urine today. He denies fever, chills, burning, urgency, or frequency. The urine is clear yellow. He has a follow up on 01/18/2023 with KUB. He is occasionally taking tramadol. Usually 1 in the evening.

## 2022-12-19 NOTE — TELEPHONE ENCOUNTER
-Take flomax  -push fluids  -pain medications as needed. Can use heating pad  -Recommend use of stool softener and laxative. Can send senna and colace.  Patient should discontinue if he begins to have loose stools      The patient was encouraged to go to the ED should they develop fever, chills, nausea, vomiting, chest pain, SOB, calf pain, feelings of incomplete emptying, or should they otherwise feel they need evaluated

## 2023-01-13 ENCOUNTER — HOSPITAL ENCOUNTER (OUTPATIENT)
Dept: GENERAL RADIOLOGY | Age: 48
Discharge: HOME | End: 2023-01-13
Payer: COMMERCIAL

## 2023-01-13 ENCOUNTER — HOSPITAL ENCOUNTER (OUTPATIENT)
Age: 48
Discharge: HOME | End: 2023-01-13
Payer: COMMERCIAL

## 2023-01-13 DIAGNOSIS — N20.0 KIDNEY STONE: ICD-10-CM

## 2023-01-13 PROCEDURE — 74018 RADEX ABDOMEN 1 VIEW: CPT

## 2023-01-18 ENCOUNTER — OFFICE VISIT (OUTPATIENT)
Dept: UROLOGY | Age: 48
End: 2023-01-18

## 2023-01-18 VITALS — BODY MASS INDEX: 34.86 KG/M2 | RESPIRATION RATE: 16 BRPM | WEIGHT: 249 LBS | HEIGHT: 71 IN

## 2023-01-18 DIAGNOSIS — N20.0 KIDNEY STONE: Primary | ICD-10-CM

## 2023-01-18 PROCEDURE — 99024 POSTOP FOLLOW-UP VISIT: CPT | Performed by: UROLOGY

## 2023-01-18 NOTE — PROGRESS NOTES
Dr. August Ashford MD  [facility] [place], [place])  Urology Clinic      Patient:  Delia Lloyd  YOB: 1975  Date: 1/18/2023    HISTORY OF PRESENT ILLNESS:   The patient is a 56 y.o. male who presents today for follow-up for the following problem(s): Bilateral kidney stones. Had bilateral SWL in 2022 Dec.    Overall the problem(s) : are improving. Associated Symptoms: No dysuria, gross hematuria. Pain Severity: 0/10    Summary of old records:   2022 SWL bilateral    Imaging/Labs reviewed during today's visit:  I have independently reviewed and verified the following films during today's visit. KUB reviewed and quite a bit better. Still has left upper pole 4mm stone. Lower pole stone gone. Right side is just dust.     Last several PSA's:  No results found for: PSA    Last total testosterone:  No results found for: TESTOSTERONE    Urinalysis today:  No results found for this visit on 01/18/23.     Last BUN and creatinine:  Lab Results   Component Value Date    BUN 15 12/01/2022     Lab Results   Component Value Date    CREATININE 1.1 12/01/2022       Imaging Reviewed during this Office Visit:   (results were independently reviewed by physician and radiology report verified)    PAST MEDICAL, FAMILY AND SOCIAL HISTORY UPDATE:  Past Medical History:   Diagnosis Date    Gout     Hypertension      Past Surgical History:   Procedure Laterality Date    LITHOTRIPSY Bilateral 12/14/2022    ESWL EXTRACORPOREAL SHOCK WAVE LITHOTRIPSY performed by August Ashford MD at [facility]      as a child     Family History   Problem Relation Age of Onset    High Blood Pressure Father      Outpatient Medications Marked as Taking for the 1/18/23 encounter (Office Visit) with August Ashford MD   Medication Sig Dispense Refill    allopurinol (ZYLOPRIM) 300 MG tablet TAKE 1 TABLET DAILY 90 tablet 3    amLODIPine-benazepril (LOTREL) 10-20 MG per capsule TAKE 1 CAPSULE DAILY 90 capsule 3       Patient has no known allergies. Social History     Tobacco Use   Smoking Status Never   Smokeless Tobacco Never       Social History     Substance and Sexual Activity   Alcohol Use No       REVIEW OF SYSTEMS:  Constitutional: negative  Eyes: negative  Respiratory: negative  Cardiovascular: negative  Gastrointestinal: negative  Musculoskeletal: negative  Genitourinary: negative except for what is in HPI  Skin: negative   Neurological: negative  Hematological/Lymphatic: negative  Psychological: negative    Physical Exam:      Vitals:    01/18/23 0819   Resp: 16     Patient is a 56 y.o. male in no acute distress and alert and oriented to person, place and time. NAD, A/o  Non labored respiration  Normal peripheral pulses  Skin- warm and dry  Psych- normal mood and affect      Assessment and Plan      1. Kidney stone           Plan:      No follow-ups on file. Low salt diet  Increase water  KUb looks quite a bit better today.  Still small stones dust bilateral with continued left upper pole stone (could not treat this one w SWL due to shock limit),          Dr. Risa Lerner MD

## 2023-08-31 ENCOUNTER — OFFICE VISIT (OUTPATIENT)
Dept: PULMONOLOGY | Age: 48
End: 2023-08-31
Payer: COMMERCIAL

## 2023-08-31 VITALS
WEIGHT: 248.2 LBS | HEIGHT: 71 IN | DIASTOLIC BLOOD PRESSURE: 68 MMHG | OXYGEN SATURATION: 98 % | HEART RATE: 68 BPM | BODY MASS INDEX: 34.75 KG/M2 | SYSTOLIC BLOOD PRESSURE: 120 MMHG | TEMPERATURE: 97.6 F

## 2023-08-31 DIAGNOSIS — E66.9 OBESITY (BMI 30-39.9): ICD-10-CM

## 2023-08-31 DIAGNOSIS — Z99.89 OSA ON CPAP: Primary | ICD-10-CM

## 2023-08-31 DIAGNOSIS — G47.33 OSA ON CPAP: Primary | ICD-10-CM

## 2023-08-31 PROCEDURE — G8417 CALC BMI ABV UP PARAM F/U: HCPCS | Performed by: NURSE PRACTITIONER

## 2023-08-31 PROCEDURE — 99214 OFFICE O/P EST MOD 30 MIN: CPT | Performed by: NURSE PRACTITIONER

## 2023-08-31 PROCEDURE — 1036F TOBACCO NON-USER: CPT | Performed by: NURSE PRACTITIONER

## 2023-08-31 PROCEDURE — G8427 DOCREV CUR MEDS BY ELIG CLIN: HCPCS | Performed by: NURSE PRACTITIONER

## 2023-08-31 ASSESSMENT — ENCOUNTER SYMPTOMS
ALLERGIC/IMMUNOLOGIC NEGATIVE: 1
RESPIRATORY NEGATIVE: 1

## 2023-09-12 ASSESSMENT — PATIENT HEALTH QUESTIONNAIRE - PHQ9
SUM OF ALL RESPONSES TO PHQ9 QUESTIONS 1 & 2: 0
1. LITTLE INTEREST OR PLEASURE IN DOING THINGS: NOT AT ALL
SUM OF ALL RESPONSES TO PHQ9 QUESTIONS 1 & 2: 0
2. FEELING DOWN, DEPRESSED OR HOPELESS: NOT AT ALL
SUM OF ALL RESPONSES TO PHQ QUESTIONS 1-9: 0
2. FEELING DOWN, DEPRESSED OR HOPELESS: 0
1. LITTLE INTEREST OR PLEASURE IN DOING THINGS: 0
SUM OF ALL RESPONSES TO PHQ QUESTIONS 1-9: 0

## 2023-09-15 ENCOUNTER — OFFICE VISIT (OUTPATIENT)
Dept: FAMILY MEDICINE CLINIC | Age: 48
End: 2023-09-15
Payer: COMMERCIAL

## 2023-09-15 VITALS
SYSTOLIC BLOOD PRESSURE: 118 MMHG | DIASTOLIC BLOOD PRESSURE: 72 MMHG | HEIGHT: 71 IN | BODY MASS INDEX: 35.24 KG/M2 | WEIGHT: 251.7 LBS | HEART RATE: 72 BPM | RESPIRATION RATE: 16 BRPM

## 2023-09-15 DIAGNOSIS — E66.01 SEVERE OBESITY (BMI 35.0-39.9) WITH COMORBIDITY (HCC): ICD-10-CM

## 2023-09-15 DIAGNOSIS — I10 ESSENTIAL HYPERTENSION: ICD-10-CM

## 2023-09-15 DIAGNOSIS — M10.00 IDIOPATHIC GOUT, UNSPECIFIED CHRONICITY, UNSPECIFIED SITE: ICD-10-CM

## 2023-09-15 DIAGNOSIS — E66.9 OBESITY (BMI 30-39.9): ICD-10-CM

## 2023-09-15 DIAGNOSIS — E78.00 PURE HYPERCHOLESTEROLEMIA: ICD-10-CM

## 2023-09-15 DIAGNOSIS — Z00.00 WELL ADULT HEALTH CHECK: Primary | ICD-10-CM

## 2023-09-15 PROCEDURE — 3078F DIAST BP <80 MM HG: CPT | Performed by: FAMILY MEDICINE

## 2023-09-15 PROCEDURE — 99396 PREV VISIT EST AGE 40-64: CPT | Performed by: FAMILY MEDICINE

## 2023-09-15 PROCEDURE — 3074F SYST BP LT 130 MM HG: CPT | Performed by: FAMILY MEDICINE

## 2023-09-15 RX ORDER — ALLOPURINOL 300 MG/1
300 TABLET ORAL DAILY
Qty: 90 TABLET | Refills: 3 | Status: SHIPPED | OUTPATIENT
Start: 2023-09-15

## 2023-09-15 SDOH — ECONOMIC STABILITY: INCOME INSECURITY: HOW HARD IS IT FOR YOU TO PAY FOR THE VERY BASICS LIKE FOOD, HOUSING, MEDICAL CARE, AND HEATING?: NOT HARD AT ALL

## 2023-09-15 SDOH — ECONOMIC STABILITY: FOOD INSECURITY: WITHIN THE PAST 12 MONTHS, THE FOOD YOU BOUGHT JUST DIDN'T LAST AND YOU DIDN'T HAVE MONEY TO GET MORE.: NEVER TRUE

## 2023-09-15 SDOH — ECONOMIC STABILITY: HOUSING INSECURITY
IN THE LAST 12 MONTHS, WAS THERE A TIME WHEN YOU DID NOT HAVE A STEADY PLACE TO SLEEP OR SLEPT IN A SHELTER (INCLUDING NOW)?: NO

## 2023-09-15 SDOH — ECONOMIC STABILITY: FOOD INSECURITY: WITHIN THE PAST 12 MONTHS, YOU WORRIED THAT YOUR FOOD WOULD RUN OUT BEFORE YOU GOT MONEY TO BUY MORE.: NEVER TRUE

## 2023-09-15 ASSESSMENT — ENCOUNTER SYMPTOMS
GASTROINTESTINAL NEGATIVE: 1
RESPIRATORY NEGATIVE: 1

## 2023-09-15 NOTE — PROGRESS NOTES
9/15/2023    Josue Betancourt (:  1975) is a 53 y.o. male, here for a preventive medicine evaluation. Chief Complaint   Patient presents with    Annual Exam    Medication Refill     Annual eval.    No concerns. BP Readings from Last 3 Encounters:   09/15/23 118/72   23 120/68   22 114/65     Wt Readings from Last 3 Encounters:   09/15/23 251 lb 11.2 oz (114.2 kg)   23 248 lb 3.2 oz (112.6 kg)   23 249 lb (112.9 kg)         Patient Active Problem List   Diagnosis    Hyperhidrosis    Gout    Hyperlipidemia       Review of Systems   Constitutional: Negative. HENT: Negative. Respiratory: Negative. Cardiovascular: Negative. Gastrointestinal: Negative. Musculoskeletal: Negative. All other systems reviewed and are negative. Prior to Visit Medications    Medication Sig Taking? Authorizing Provider   allopurinol (ZYLOPRIM) 300 MG tablet Take 1 tablet by mouth daily Yes Ck Ross DO   amLODIPine-benazepril (LOTREL) 10-20 MG per capsule TAKE 1 CAPSULE DAILY Yes Ck Ross, DO        No Known Allergies    Past Medical History:   Diagnosis Date    Gout     Hypertension        Past Surgical History:   Procedure Laterality Date    LITHOTRIPSY Bilateral 2022    ESWL EXTRACORPOREAL SHOCK WAVE LITHOTRIPSY performed by Erica Greer MD at [address]      as a child       Social History     Socioeconomic History    Marital status:      Spouse name: Mariajose Ayala    Number of children: 3    Years of education: 16    Highest education level:  Bachelor's degree (e.g., BA, AB, BS)   Occupational History    Not on file   Tobacco Use    Smoking status: Never    Smokeless tobacco: Never   Vaping Use    Vaping Use: Never used   Substance and Sexual Activity    Alcohol use: No    Drug use: No    Sexual activity: Not on file   Other Topics Concern    Not on file   Social History Narrative    Not on file     Social Determinants of Health

## 2024-01-25 ENCOUNTER — TELEPHONE (OUTPATIENT)
Dept: PULMONOLOGY | Age: 49
End: 2024-01-25

## 2024-01-25 NOTE — TELEPHONE ENCOUNTER
Pt has switched DME's and needs a new script for supplies sent to [facility] in [place] fax number [phone].  Please advise pt at [phone]

## 2024-06-26 DIAGNOSIS — I10 ESSENTIAL HYPERTENSION: ICD-10-CM

## 2024-06-27 RX ORDER — AMLODIPINE BESYLATE AND BENAZEPRIL HYDROCHLORIDE 10; 20 MG/1; MG/1
1 CAPSULE ORAL DAILY
Qty: 90 CAPSULE | Refills: 3 | Status: SHIPPED | OUTPATIENT
Start: 2024-06-27

## 2024-09-20 ENCOUNTER — OFFICE VISIT (OUTPATIENT)
Dept: FAMILY MEDICINE CLINIC | Age: 49
End: 2024-09-20
Payer: COMMERCIAL

## 2024-09-20 VITALS
RESPIRATION RATE: 16 BRPM | HEIGHT: 71 IN | SYSTOLIC BLOOD PRESSURE: 122 MMHG | WEIGHT: 258.7 LBS | DIASTOLIC BLOOD PRESSURE: 74 MMHG | HEART RATE: 80 BPM | BODY MASS INDEX: 36.22 KG/M2

## 2024-09-20 DIAGNOSIS — E78.00 PURE HYPERCHOLESTEROLEMIA: ICD-10-CM

## 2024-09-20 DIAGNOSIS — Z00.00 WELL ADULT HEALTH CHECK: Primary | ICD-10-CM

## 2024-09-20 DIAGNOSIS — E66.9 OBESITY (BMI 30-39.9): ICD-10-CM

## 2024-09-20 DIAGNOSIS — M10.00 IDIOPATHIC GOUT, UNSPECIFIED CHRONICITY, UNSPECIFIED SITE: ICD-10-CM

## 2024-09-20 DIAGNOSIS — I10 ESSENTIAL HYPERTENSION: ICD-10-CM

## 2024-09-20 PROCEDURE — 99396 PREV VISIT EST AGE 40-64: CPT | Performed by: FAMILY MEDICINE

## 2024-09-20 PROCEDURE — 3074F SYST BP LT 130 MM HG: CPT | Performed by: FAMILY MEDICINE

## 2024-09-20 PROCEDURE — 3078F DIAST BP <80 MM HG: CPT | Performed by: FAMILY MEDICINE

## 2024-09-20 RX ORDER — INDOMETHACIN 50 MG/1
50 CAPSULE ORAL EVERY 8 HOURS PRN
Qty: 30 CAPSULE | Refills: 2 | Status: SHIPPED | OUTPATIENT
Start: 2024-09-20

## 2024-09-20 SDOH — ECONOMIC STABILITY: INCOME INSECURITY: HOW HARD IS IT FOR YOU TO PAY FOR THE VERY BASICS LIKE FOOD, HOUSING, MEDICAL CARE, AND HEATING?: NOT HARD AT ALL

## 2024-09-20 SDOH — ECONOMIC STABILITY: FOOD INSECURITY: WITHIN THE PAST 12 MONTHS, YOU WORRIED THAT YOUR FOOD WOULD RUN OUT BEFORE YOU GOT MONEY TO BUY MORE.: NEVER TRUE

## 2024-09-20 SDOH — ECONOMIC STABILITY: FOOD INSECURITY: WITHIN THE PAST 12 MONTHS, THE FOOD YOU BOUGHT JUST DIDN'T LAST AND YOU DIDN'T HAVE MONEY TO GET MORE.: NEVER TRUE

## 2024-09-20 ASSESSMENT — PATIENT HEALTH QUESTIONNAIRE - PHQ9
1. LITTLE INTEREST OR PLEASURE IN DOING THINGS: NOT AT ALL
SUM OF ALL RESPONSES TO PHQ QUESTIONS 1-9: 0
SUM OF ALL RESPONSES TO PHQ QUESTIONS 1-9: 0
2. FEELING DOWN, DEPRESSED OR HOPELESS: NOT AT ALL
SUM OF ALL RESPONSES TO PHQ9 QUESTIONS 1 & 2: 0
SUM OF ALL RESPONSES TO PHQ QUESTIONS 1-9: 0
SUM OF ALL RESPONSES TO PHQ QUESTIONS 1-9: 0

## 2024-09-20 ASSESSMENT — ENCOUNTER SYMPTOMS
RESPIRATORY NEGATIVE: 1
GASTROINTESTINAL NEGATIVE: 1

## 2024-10-01 ENCOUNTER — OFFICE VISIT (OUTPATIENT)
Dept: PULMONOLOGY | Age: 49
End: 2024-10-01
Payer: COMMERCIAL

## 2024-10-01 VITALS
DIASTOLIC BLOOD PRESSURE: 82 MMHG | HEIGHT: 70 IN | BODY MASS INDEX: 38.02 KG/M2 | OXYGEN SATURATION: 98 % | WEIGHT: 265.6 LBS | HEART RATE: 79 BPM | SYSTOLIC BLOOD PRESSURE: 128 MMHG | TEMPERATURE: 98.2 F

## 2024-10-01 DIAGNOSIS — Z78.9 DIFFICULTY USING CONTINUOUS POSITIVE AIRWAY PRESSURE (CPAP) DEVICE: ICD-10-CM

## 2024-10-01 DIAGNOSIS — G47.33 OSA (OBSTRUCTIVE SLEEP APNEA): Primary | ICD-10-CM

## 2024-10-01 DIAGNOSIS — R06.83 SNORING: ICD-10-CM

## 2024-10-01 DIAGNOSIS — E66.9 OBESITY (BMI 30-39.9): ICD-10-CM

## 2024-10-01 PROCEDURE — G8417 CALC BMI ABV UP PARAM F/U: HCPCS | Performed by: NURSE PRACTITIONER

## 2024-10-01 PROCEDURE — G8484 FLU IMMUNIZE NO ADMIN: HCPCS | Performed by: NURSE PRACTITIONER

## 2024-10-01 PROCEDURE — 1036F TOBACCO NON-USER: CPT | Performed by: NURSE PRACTITIONER

## 2024-10-01 PROCEDURE — G8427 DOCREV CUR MEDS BY ELIG CLIN: HCPCS | Performed by: NURSE PRACTITIONER

## 2024-10-01 PROCEDURE — 99214 OFFICE O/P EST MOD 30 MIN: CPT | Performed by: NURSE PRACTITIONER

## 2024-10-01 NOTE — PROGRESS NOTES
on CPAP however pt is not tolerating therapy, has difficulty wearing every night and often feels he sleeps better without the mask .   - Discussed Inspire with patient  - Discussed qualifications and contraindications  - Discussed risk and benefit of Inspire vs PAP  - Discussed the whole process of Inspire   - Discussed ENT referral   - Patient is  candidate for Inspire- except his sleep study is just 6 months over 2 years old.   - Last PSG 4/25/2022, checked with inspire reps and his insurance is going to require repeat PSG and has to be in lab for [facility], pt agreeable to go for sleep study and then referral to ENT to explore Inspire as other treatment option     In meantime he is willing to continue CPAP as much as he can tolerate to help reduce cardiovascular risks     - He  was advised to continue current positive airway pressure therapy with above described pressure.   - He  advised to keep good compliance with current recommended pressure to get optimal results and clinical improvement  - Recommend 7-9 hours of sleep with PAP  - He was advised to call [employer] regarding supplies if needed.   -He call my office for earlier appointment if needed for worsening of sleep symptoms.     -     Ambulatory referral to ENT- pt request Dr Duran at [facility]   -     Baseline Diagnostic Sleep Study; Future    Difficulty using continuous positive airway pressure (CPAP) device- persistent   -     Ambulatory referral to ENT  -     Baseline Diagnostic Sleep Study; Future    Obesity (BMI 30-39.9)- stable   Pt counseled on obesity, health risks associated with obesity and counseled on need for weight reduction.   His Current BMI does meet [facility] requirements for inspire     -     Ambulatory referral to ENT  -     Baseline Diagnostic Sleep Study; Future    Snoring- controlled on CPAP   -     Ambulatory referral to ENT  -     Baseline Diagnostic Sleep Study; Future    Patient verbalizes understanding.  We will see Beto Osuna back after

## 2024-10-24 ENCOUNTER — TELEPHONE (OUTPATIENT)
Dept: SLEEP CENTER | Age: 49
End: 2024-10-24

## 2024-10-24 NOTE — TELEPHONE ENCOUNTER
Called to Beto. I had to leave a voicemail for him to call us back to schedule f/u after sleep study 10/27/24. Thank you   
Has Your Skin Lesion Been Treated?: not been treated
Please schedule a f/u appt for Beto.  His PSG is scheduled for this Sunday, 10/27/24.        Thank you,  Radha  
Is This A New Presentation, Or A Follow-Up?: Skin Lesion

## 2024-11-04 ENCOUNTER — TELEPHONE (OUTPATIENT)
Dept: PULMONOLOGY | Age: 49
End: 2024-11-04

## 2024-11-04 NOTE — TELEPHONE ENCOUNTER
[facility] called in asking when patient is scheduled for his sleep study. I told her that it looks like he no-showed the sleep study on 10/27/2024. She said that they received an inspire referral for the patient but if he does not have that sleep study there is no point in scheduling him as his last study would be too old. I told her that I would try to call the patient to get his sleep study rescheduled. She asked that I call her once the study is scheduled so they can get him scheduled for inspire evaluation.  -  I called patient and he states that he cancelled the study due to insurances issues. Patient states that he wants to wait until after the 1st of the year for his study and his inspire evaluation. He states that [facility] Dr. Prado is out of network with his insurance. He asked if there was anyone else he could go to. I told him that the only people that I personally know of are Dr. Mota at [facility] and Dr. Rodriguez at [facility]. He states that he will look into both of them with his insurance and let us know where the referral will need to be sent to. I transferred patient to [facility] sleep lab to schedule his sleep study.  -  I called [facility] and notified Fawn that they are out of network with patient's insurance and he will be looking into other ENTs for the Inspire evaluation. She verbalized understanding.  -  I am sending this to Kendra Lopez as an FYI.

## 2024-12-13 ENCOUNTER — TELEPHONE (OUTPATIENT)
Dept: PULMONOLOGY | Age: 49
End: 2024-12-13

## 2024-12-13 DIAGNOSIS — E66.9 OBESITY (BMI 30-39.9): ICD-10-CM

## 2024-12-13 DIAGNOSIS — Z78.9 DIFFICULTY USING CONTINUOUS POSITIVE AIRWAY PRESSURE (CPAP) DEVICE: ICD-10-CM

## 2024-12-13 DIAGNOSIS — G47.33 OSA (OBSTRUCTIVE SLEEP APNEA): Primary | ICD-10-CM

## 2024-12-13 NOTE — TELEPHONE ENCOUNTER
Pastor left a voicemail. He did check with his insurance and Dr Rodriguez & [facility] are in his network. He doesn't care which one you choose. Please advise. Thank you

## 2024-12-13 NOTE — TELEPHONE ENCOUNTER
Pt went with Dr Cooper at [facility] due to wait times at [facility], I placed an order for referral to Dr Rodriguez

## 2024-12-16 NOTE — TELEPHONE ENCOUNTER
Called to Beto. I gave him the contact information for Dr. Rodriguez and faxed to records. Thank you

## 2025-01-06 ENCOUNTER — TELEPHONE (OUTPATIENT)
Dept: SLEEP CENTER | Age: 50
End: 2025-01-06

## 2025-01-06 ENCOUNTER — HOSPITAL ENCOUNTER (OUTPATIENT)
Dept: SLEEP CENTER | Age: 50
Discharge: HOME | End: 2025-01-08
Payer: COMMERCIAL

## 2025-01-06 DIAGNOSIS — G47.33 OSA (OBSTRUCTIVE SLEEP APNEA): ICD-10-CM

## 2025-01-06 DIAGNOSIS — Z78.9 DIFFICULTY USING CONTINUOUS POSITIVE AIRWAY PRESSURE (CPAP) DEVICE: ICD-10-CM

## 2025-01-06 DIAGNOSIS — R06.83 SNORING: ICD-10-CM

## 2025-01-06 DIAGNOSIS — E66.9 OBESITY (BMI 30-39.9): ICD-10-CM

## 2025-01-06 PROCEDURE — 95810 POLYSOM 6/> YRS 4/> PARAM: CPT

## 2025-01-15 NOTE — PROGRESS NOTES
[facility]    Beto Osuna, 49 y.o.  [id number]    Nurses Notes   Here to review recent PSG  Study Results  Initial Study Date -  1/6/25  AHI -  20.3    Total Events - 100  (Apneas  6  Hypopneas 94  Central  0)  LM w/Arousals - 0  Sleep Efficiency - 59 % (Total Sleep Time - 296 min)  Time with Sats below 88% - 9.9 min      PAP Download:   Original or initial  AHI: 54.5     Date of initial study: 4/25/22   Compliant  83%      Noncompliant 17 %     PAP Type APAP    Level  6/20 cmH2O   Avg Hrs/Day 6hrs 18mins   Recorded compliance dates , 12/14/24  to 1/12/25   Total Hours: 5,305  Machine/Mfg: Resmed Interface: Nasal Pillows    Provider:  []-HMJOEY  []Butch  []Konstantin  [x]Lincphyllis         [][facility] []Other:     Neck Size: 18.5 inches  Mallampati 2  ESS:  4  SAQLI:  80    Interval History       Beto Osuna is a 49 y.o. old malewho comes in to review the results of his recent sleep study, study needed to proceed with inspire referral .   Is currently scheduled for DISE/ septoplasty in April with Dr Rodriguez  Is still using his CPAP currently to reduce cardiovascular risks.     Meds  Current Outpatient Medications   Medication Sig Dispense Refill    indomethacin (INDOCIN) 50 MG capsule Take 1 capsule by mouth every 8 hours as needed (gout) 30 capsule 2    amLODIPine-benazepril (LOTREL) 10-20 MG per capsule Take 1 capsule by mouth daily 90 capsule 3    allopurinol (ZYLOPRIM) 300 MG tablet Take 1 tablet by mouth daily 90 tablet 3     No current facility-administered medications for this visit.     ROS  Review of Systems   Psychiatric/Behavioral:  Positive for sleep disturbance.    All other systems reviewed and are negative.     Exam  /82 (Site: Right Upper Arm, Position: Sitting, Cuff Size: Large Adult)   Pulse 70   Temp 97.8 °F (36.6 °C) (Oral)   Ht 1.778 m (5' 10\")   Wt 125.9 kg (277 lb 9.6 oz)   SpO2 98% Comment: r/a  BMI 39.83 kg/m²    Body mass index is 39.83

## 2025-01-16 ENCOUNTER — OFFICE VISIT (OUTPATIENT)
Dept: PULMONOLOGY | Age: 50
End: 2025-01-16
Payer: COMMERCIAL

## 2025-01-16 VITALS
HEIGHT: 70 IN | WEIGHT: 277.6 LBS | BODY MASS INDEX: 39.74 KG/M2 | OXYGEN SATURATION: 98 % | SYSTOLIC BLOOD PRESSURE: 138 MMHG | DIASTOLIC BLOOD PRESSURE: 82 MMHG | HEART RATE: 70 BPM | TEMPERATURE: 97.8 F

## 2025-01-16 DIAGNOSIS — E66.9 OBESITY (BMI 30-39.9): ICD-10-CM

## 2025-01-16 DIAGNOSIS — G47.33 OSA (OBSTRUCTIVE SLEEP APNEA): Primary | ICD-10-CM

## 2025-01-16 DIAGNOSIS — Z78.9 DIFFICULTY USING CONTINUOUS POSITIVE AIRWAY PRESSURE (CPAP) DEVICE: ICD-10-CM

## 2025-01-16 PROCEDURE — G8417 CALC BMI ABV UP PARAM F/U: HCPCS | Performed by: NURSE PRACTITIONER

## 2025-01-16 PROCEDURE — 1036F TOBACCO NON-USER: CPT | Performed by: NURSE PRACTITIONER

## 2025-01-16 PROCEDURE — G8427 DOCREV CUR MEDS BY ELIG CLIN: HCPCS | Performed by: NURSE PRACTITIONER

## 2025-01-16 PROCEDURE — 99214 OFFICE O/P EST MOD 30 MIN: CPT | Performed by: NURSE PRACTITIONER

## 2025-01-30 DIAGNOSIS — M10.00 IDIOPATHIC GOUT, UNSPECIFIED CHRONICITY, UNSPECIFIED SITE: ICD-10-CM

## 2025-01-30 RX ORDER — ALLOPURINOL 300 MG/1
300 TABLET ORAL DAILY
Qty: 90 TABLET | Refills: 3 | Status: SHIPPED | OUTPATIENT
Start: 2025-01-30

## 2025-04-01 LAB
BUN BLDV-MCNC: 14 MG/DL
CALCIUM SERPL-MCNC: 9 MG/DL
CHLORIDE BLD-SCNC: 106 MMOL/L
CO2: 25 MMOL/L
CREAT SERPL-MCNC: 1 MG/DL
EGFR: 88
GLUCOSE BLD-MCNC: 100 MG/DL
POTASSIUM SERPL-SCNC: 4.3 MMOL/L
SODIUM BLD-SCNC: 143 MMOL/L

## 2025-04-07 ENCOUNTER — TELEPHONE (OUTPATIENT)
Dept: FAMILY MEDICINE CLINIC | Age: 50
End: 2025-04-07

## 2025-04-07 NOTE — TELEPHONE ENCOUNTER
Fara with Dr. Rodriguez's office called requesting the pre op H&P. Advised pt is scheduled for this appt tomorrow Tuesday 4/8/25.

## 2025-04-08 ENCOUNTER — TELEPHONE (OUTPATIENT)
Dept: FAMILY MEDICINE CLINIC | Age: 50
End: 2025-04-08

## 2025-04-08 ENCOUNTER — OFFICE VISIT (OUTPATIENT)
Dept: FAMILY MEDICINE CLINIC | Age: 50
End: 2025-04-08
Payer: COMMERCIAL

## 2025-04-08 VITALS
HEART RATE: 76 BPM | DIASTOLIC BLOOD PRESSURE: 70 MMHG | SYSTOLIC BLOOD PRESSURE: 128 MMHG | RESPIRATION RATE: 18 BRPM | HEIGHT: 70 IN | BODY MASS INDEX: 37.65 KG/M2 | WEIGHT: 263 LBS

## 2025-04-08 DIAGNOSIS — E78.00 PURE HYPERCHOLESTEROLEMIA: ICD-10-CM

## 2025-04-08 DIAGNOSIS — G47.33 OSA (OBSTRUCTIVE SLEEP APNEA): ICD-10-CM

## 2025-04-08 DIAGNOSIS — Z01.818 PRE-OP EVALUATION: Primary | ICD-10-CM

## 2025-04-08 DIAGNOSIS — J34.2 DNS (DEVIATED NASAL SEPTUM): ICD-10-CM

## 2025-04-08 DIAGNOSIS — J34.3 NASAL TURBINATE HYPERTROPHY: ICD-10-CM

## 2025-04-08 DIAGNOSIS — E66.9 OBESITY (BMI 30-39.9): ICD-10-CM

## 2025-04-08 DIAGNOSIS — I10 ESSENTIAL HYPERTENSION: ICD-10-CM

## 2025-04-08 PROCEDURE — 99214 OFFICE O/P EST MOD 30 MIN: CPT | Performed by: FAMILY MEDICINE

## 2025-04-08 PROCEDURE — G2211 COMPLEX E/M VISIT ADD ON: HCPCS | Performed by: FAMILY MEDICINE

## 2025-04-08 PROCEDURE — 3078F DIAST BP <80 MM HG: CPT | Performed by: FAMILY MEDICINE

## 2025-04-08 PROCEDURE — G8427 DOCREV CUR MEDS BY ELIG CLIN: HCPCS | Performed by: FAMILY MEDICINE

## 2025-04-08 PROCEDURE — 3074F SYST BP LT 130 MM HG: CPT | Performed by: FAMILY MEDICINE

## 2025-04-08 PROCEDURE — G8417 CALC BMI ABV UP PARAM F/U: HCPCS | Performed by: FAMILY MEDICINE

## 2025-04-08 PROCEDURE — 1036F TOBACCO NON-USER: CPT | Performed by: FAMILY MEDICINE

## 2025-04-08 SDOH — ECONOMIC STABILITY: FOOD INSECURITY: WITHIN THE PAST 12 MONTHS, THE FOOD YOU BOUGHT JUST DIDN'T LAST AND YOU DIDN'T HAVE MONEY TO GET MORE.: NEVER TRUE

## 2025-04-08 SDOH — ECONOMIC STABILITY: FOOD INSECURITY: WITHIN THE PAST 12 MONTHS, YOU WORRIED THAT YOUR FOOD WOULD RUN OUT BEFORE YOU GOT MONEY TO BUY MORE.: NEVER TRUE

## 2025-04-08 ASSESSMENT — ENCOUNTER SYMPTOMS
RESPIRATORY NEGATIVE: 1
GASTROINTESTINAL NEGATIVE: 1

## 2025-04-08 ASSESSMENT — PATIENT HEALTH QUESTIONNAIRE - PHQ9
SUM OF ALL RESPONSES TO PHQ QUESTIONS 1-9: 0
1. LITTLE INTEREST OR PLEASURE IN DOING THINGS: NOT AT ALL
2. FEELING DOWN, DEPRESSED OR HOPELESS: NOT AT ALL

## 2025-04-08 NOTE — TELEPHONE ENCOUNTER
Called [facility] medical records [phone] extension [phone] and they will fax patient pre-op testing. Patient had lab work,chest xray and EKG completed.

## 2025-04-08 NOTE — PROGRESS NOTES
Beto Osuna (:  1975) is a 49 y.o. male,Established patient, here for evaluation of the following chief complaint(s):  Pre-op Exam (Sinus surgery 4/15/2025 with Dr. Rodriguez )          Subjective   SUBJECTIVE/OBJECTIVE:  HPI  Chief Complaint   Patient presents with    Pre-op Exam     Sinus surgery 4/15/2025 with Dr. Rodriguez      Pre-op evaluation.  Scheduled for surgery with Dr. Rodriguez on 4/15/25.    Pt denies CP, chest tightness, SOB.  Able to perform 4 METs with no cardiac symptoms.      BP Readings from Last 3 Encounters:   25 128/70   25 138/82   10/01/24 128/82     Pt denies hx of general anesthesia complications.    Patient Active Problem List   Diagnosis    Hyperhidrosis    Gout    Hyperlipidemia       Current Outpatient Medications   Medication Sig Dispense Refill    allopurinol (ZYLOPRIM) 300 MG tablet TAKE 1 TABLET DAILY 90 tablet 3    amLODIPine-benazepril (LOTREL) 10-20 MG per capsule Take 1 capsule by mouth daily 90 capsule 3     No current facility-administered medications for this visit.       Past Surgical History:   Procedure Laterality Date    LITHOTRIPSY Bilateral 2022    ESWL EXTRACORPOREAL SHOCK WAVE LITHOTRIPSY performed by Braydon Mckinley MD at [facility] OR    TONSILLECTOMY      as a child       Review of Systems   Constitutional: Negative.    HENT: Negative.     Respiratory: Negative.     Cardiovascular: Negative.    Gastrointestinal: Negative.    Musculoskeletal: Negative.    All other systems reviewed and are negative.         Objective   Physical Exam  Vitals and nursing note reviewed.   Constitutional:       General: He is not in acute distress.     Appearance: Normal appearance. He is well-developed.   HENT:      Head: Normocephalic and atraumatic.      Right Ear: Tympanic membrane normal.      Left Ear: Tympanic membrane normal.   Eyes:      Conjunctiva/sclera: Conjunctivae normal.   Cardiovascular:      Rate and Rhythm: Normal rate and regular rhythm.      Heart

## 2025-04-09 NOTE — TELEPHONE ENCOUNTER
Say Hickey, DO  [facility]  Seen for pre-op yesterday.  Called [facility] for results, only received the labs.  Please call for CXR and EKG.    Call placed to [facility], spoke to Jory in medical records. They are faxing over the EKG and CXR.

## 2025-05-15 ENCOUNTER — RESULTS FOLLOW-UP (OUTPATIENT)
Dept: PULMONOLOGY | Age: 50
End: 2025-05-15